# Patient Record
Sex: FEMALE | URBAN - METROPOLITAN AREA
[De-identification: names, ages, dates, MRNs, and addresses within clinical notes are randomized per-mention and may not be internally consistent; named-entity substitution may affect disease eponyms.]

---

## 2018-01-02 ENCOUNTER — TRANSFERRED RECORDS (OUTPATIENT)
Dept: HEALTH INFORMATION MANAGEMENT | Facility: CLINIC | Age: 78
End: 2018-01-02

## 2018-01-02 ENCOUNTER — HOSPITAL ENCOUNTER (INPATIENT)
Facility: CLINIC | Age: 78
LOS: 2 days | Discharge: HOME | DRG: 247 | End: 2018-01-04
Attending: INTERNAL MEDICINE | Admitting: INTERNAL MEDICINE
Payer: MEDICARE

## 2018-01-02 DIAGNOSIS — I24.9 ACS (ACUTE CORONARY SYNDROME) (H): Primary | ICD-10-CM

## 2018-01-02 LAB — GLUCOSE BLDC GLUCOMTR-MCNC: 106 MG/DL (ref 70–99)

## 2018-01-02 PROCEDURE — 84484 ASSAY OF TROPONIN QUANT: CPT | Performed by: HOSPITALIST

## 2018-01-02 PROCEDURE — 85610 PROTHROMBIN TIME: CPT | Performed by: HOSPITALIST

## 2018-01-02 PROCEDURE — 12000006 ZZH R&B IMCU INTERMEDIATE UMMC

## 2018-01-02 PROCEDURE — 85027 COMPLETE CBC AUTOMATED: CPT | Performed by: HOSPITALIST

## 2018-01-02 PROCEDURE — 80053 COMPREHEN METABOLIC PANEL: CPT | Performed by: HOSPITALIST

## 2018-01-02 PROCEDURE — 83036 HEMOGLOBIN GLYCOSYLATED A1C: CPT | Performed by: HOSPITALIST

## 2018-01-02 PROCEDURE — 93010 ELECTROCARDIOGRAM REPORT: CPT | Performed by: INTERNAL MEDICINE

## 2018-01-02 PROCEDURE — 00000146 ZZHCL STATISTIC GLUCOSE BY METER IP

## 2018-01-02 PROCEDURE — 36415 COLL VENOUS BLD VENIPUNCTURE: CPT | Performed by: HOSPITALIST

## 2018-01-02 PROCEDURE — 83880 ASSAY OF NATRIURETIC PEPTIDE: CPT | Performed by: HOSPITALIST

## 2018-01-02 RX ORDER — NICOTINE POLACRILEX 4 MG
15-30 LOZENGE BUCCAL
Status: DISCONTINUED | OUTPATIENT
Start: 2018-01-02 | End: 2018-01-04 | Stop reason: HOSPADM

## 2018-01-02 RX ORDER — ALUMINA, MAGNESIA, AND SIMETHICONE 2400; 2400; 240 MG/30ML; MG/30ML; MG/30ML
30 SUSPENSION ORAL EVERY 4 HOURS PRN
Status: DISCONTINUED | OUTPATIENT
Start: 2018-01-02 | End: 2018-01-04 | Stop reason: HOSPADM

## 2018-01-02 RX ORDER — DEXTROSE MONOHYDRATE 25 G/50ML
25-50 INJECTION, SOLUTION INTRAVENOUS
Status: DISCONTINUED | OUTPATIENT
Start: 2018-01-02 | End: 2018-01-04 | Stop reason: HOSPADM

## 2018-01-02 RX ORDER — ACETAMINOPHEN 325 MG/1
650 TABLET ORAL EVERY 4 HOURS PRN
Status: DISCONTINUED | OUTPATIENT
Start: 2018-01-02 | End: 2018-01-04 | Stop reason: HOSPADM

## 2018-01-02 RX ORDER — LIDOCAINE 40 MG/G
CREAM TOPICAL
Status: DISCONTINUED | OUTPATIENT
Start: 2018-01-02 | End: 2018-01-03

## 2018-01-02 RX ORDER — ACETAMINOPHEN 650 MG/1
650 SUPPOSITORY RECTAL EVERY 4 HOURS PRN
Status: DISCONTINUED | OUTPATIENT
Start: 2018-01-02 | End: 2018-01-04 | Stop reason: HOSPADM

## 2018-01-02 RX ORDER — NITROGLYCERIN 0.4 MG/1
0.4 TABLET SUBLINGUAL EVERY 5 MIN PRN
Status: DISCONTINUED | OUTPATIENT
Start: 2018-01-02 | End: 2018-01-03

## 2018-01-02 ASSESSMENT — ACTIVITIES OF DAILY LIVING (ADL)
BATHING: 0-->INDEPENDENT
COGNITION: 0 - NO COGNITION ISSUES REPORTED
TOILETING: 0-->INDEPENDENT
RETIRED_EATING: 0-->INDEPENDENT
RETIRED_COMMUNICATION: 0-->UNDERSTANDS/COMMUNICATES WITHOUT DIFFICULTY
AMBULATION: 0-->INDEPENDENT
TRANSFERRING: 0-->INDEPENDENT
SWALLOWING: 0-->SWALLOWS FOODS/LIQUIDS WITHOUT DIFFICULTY
FALL_HISTORY_WITHIN_LAST_SIX_MONTHS: NO
DRESS: 0-->INDEPENDENT

## 2018-01-02 NOTE — IP AVS SNAPSHOT
[address] [address]    Phone:  [phone]                                       After Visit Summary   1/2/2018    Katey Burrows    MRN: [id number]           After Visit Summary Signature Page     I have received my discharge instructions, and my questions have been answered. I have discussed any challenges I see with this plan with the nurse or doctor.    ..........................................................................................................................................  Patient/Patient Representative Signature      ..........................................................................................................................................  Patient Representative Print Name and Relationship to Patient    ..................................................               ................................................  Date                                            Time    ..........................................................................................................................................  Reviewed by Signature/Title    ...................................................              ..............................................  Date                                                            Time

## 2018-01-02 NOTE — IP AVS SNAPSHOT
MRN:[id number]                      After Visit Summary   1/2/2018    Katey Burrows    MRN: [id number]           Thank you!     Thank you for choosing [facility] for your care. Our goal is always to provide you with excellent care. Hearing back from our patients is one way we can continue to improve our services. Please take a few minutes to complete the written survey that you may receive in the mail after you visit with us. Thank you!        Patient Information     Date Of Birth          1940        Designated Caregiver       Most Recent Value    Caregiver    Will someone help with your care after discharge? 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                  Who to Call     For medical emergencies, please call 911.  For non-urgent questions about your medical care, please call your primary care provider or clinic, None          Attending Provider     Provider Specialty    Luciano Baldwin MD Cardiology       Primary Care Provider Fax #    Physician No Ref-Primary [phone]      After Care Instructions     Activity       Your activity upon discharge: activity as tolerated            Diet       Follow this diet upon discharge: Orders Placed This Encounter      Regular Diet Adult                  Follow-up Appointments     Adult [facility] Follow-up and recommended labs and tests       Follow up locally in [place] for cardiovascular care upon return.  You have requested records be sent to you for that appointment.  If you need assistance or need to see a provider here, the contact information:    Appointments on [place] and/or [facility]  (with [facility] or [facility] provider or service). Call [phone].                  Further instructions from your care team       Going Home after Coronary Angioplasty or Stent Placement       Name: aKtey Burrows  Medical Record Number:  [id number]  Today's Date: January 4, 2018        For 24 hours:         Have an adult stay with you for 24 hours.         Relax and take it easy.         Drink plenty of fluids.         You may eat your normal diet, unless your doctor tells you otherwise.         Do NOT make any important or legal decisions.         Do NOT drive or operate machines at home or at work.         Do NOT drink alcohol.      Do NOT smoke.     Medicines:         If you have begun Plavix (clopidogrel), Effient (prasugrel), or Brilinta (ticagrelor), do not stop taking it until you talk to your heart doctor (cardiologist).         If you are on metformin (Glucophage), do not restart it until you have blood tests (within 2 to 3 days after discharge). When your doctor tells you it is safe, you may restart the metformin.         If you have stopped any other medicines, check with your nurse or provider about when to restart them.    Care of wrist or arm site:         It is normal to have soreness at the puncture site and mild tingling in your hand for up to 3 days.           Remove the Band-Aid after 24 hours. If there is minor oozing, apply another Band-aid and remove it after 12 hours.          Do NOT take a bath, or use a hot tub or pool for the next 48 hours. You may shower.          It is normal to have a small bruise.  There should not be a lump at the site.         Do not scrub the site.         Do not use lotion or powder near the puncture site for 3 days.         For 2 days, do not use your hand or arm to support your weight (such as rising from a chair) or bend your wrist (such as lifting a garage door).         For 2 days, do not lift more than 5 pounds or exercise your arm (tennis, golf or bowling).    If you  "start bleeding from the site in your arm: Sit down and press firmly on the site with your fingers for 10 minutes. Call your doctor as soon as you can.      Call 911 right away if you have bleeding that is heavy or does not stop.     Call your doctor if:         You have a large or growing hard lump around the site.         The site is red, swollen, hot or tender.         Blood or fluid is draining from the site.         You have chills or a fever greater than 101 F (38 C).         Your leg or arm turns bluish, feels numb or cool.         You have hives, a rash or unusual itching.     Due cardiac rehab in [place].    ADDITIONAL INSTRUCTIONS: None    [facility] [place]:   [phone] (7 days a week)      Cardiology Fellow on call (24 hours per day) at [facility]:   [phone] (ask for Cardiology Fellow on call)      Number where we can reach you:  ____________    Pending Results     Date and Time Order Name Status Description    1/3/2018 1928 EKG 12-lead, tracing only Preliminary     1/3/2018 1828 EKG 12-lead, tracing only Preliminary     1/3/2018 1523 EKG 12-lead, tracing only  Preliminary             Statement of Approval     Ordered          01/04/18 1016  I have reviewed and agree with all the recommendations and orders detailed in this document.  EFFECTIVE NOW     Approved and electronically signed by:  Luciano Lnaier MD             Admission Information     Date & Time Provider Department Dept. Phone    1/2/2018 Luciano Baldwin MD [address] [facility] [place] [phone]      Your Vitals Were     Blood Pressure Pulse Temperature Respirations Height Weight    154/76 (BP Location: Right arm) 67 97.5  F (36.4  C) (Oral) 18 1.499 m (4' 11\") 79 kg (174 lb 2.6 oz)    Pulse Oximetry BMI (Body Mass Index)                98% 35.18 kg/m2          MyChart Information     [employer] lets you send messages to your doctor, view your test results, renew your prescriptions, schedule appointments " "and more. To sign up, go to [email or web address] . Click on \"Log in\" on the left side of the screen, which will take you to the Welcome page. Then click on \"Sign up Now\" on the right side of the page.     You will be asked to enter the access code listed below, as well as some personal information. Please follow the directions to create your username and password.     Your access code is: [email or web address]  Expires: 2018 11:09 AM     Your access code will  in 90 days. If you need help or a new code, please call your [facility] clinic or [phone].        Care EveryWhere ID     This is your Care EveryWhere ID. This could be used by other organizations to access your [facility] medical records  [id number]        Equal Access to Services     DENIZ [facility] Hospital[facility] : Valentin Waters, coreen bey, trae dnaiel, pearl gamble . So [facility] [phone].    ATENCIÓN: Si habla español, tiene a napoles disposición servicios gratuitos de asistencia lingüística. Llame al [phone].    We comply with applicable federal civil rights laws and [place] laws. We do not discriminate on the basis of race, color, national origin, age, disability, sex, sexual orientation, or gender identity.               Review of your medicines      START taking        Dose / Directions    atorvastatin 40 MG tablet   Commonly known as:  LIPITOR        Dose:  40 mg   Start taking on:  2018   Take 1 tablet (40 mg) by mouth daily   Quantity:  90 tablet   Refills:  3       metoprolol 25 MG tablet   Commonly known as:  LOPRESSOR        Dose:  12.5 mg   Take 0.5 tablets (12.5 mg) by mouth 2 times daily   Quantity:  180 tablet   Refills:  3       nitroGLYcerin 0.4 MG sublingual tablet   Commonly known as:  NITROSTAT        For chest pain place 1 tablet under the tongue every 5 minutes for 3 doses. If symptoms persist 5 minutes after 1st dose call 911.   Quantity:  25 tablet   Refills:  0       " ticagrelor 90 MG tablet   Commonly known as:  BRILINTA        Dose:  90 mg   Take 1 tablet (90 mg) by mouth every 12 hours   Quantity:  180 tablet   Refills:  3         CONTINUE these medicines which have NOT CHANGED        Dose / Directions    ASPIRIN 81 81 MG chewable tablet   Generic drug:  aspirin        Dose:  81 mg   Take 81 mg by mouth daily   Refills:  0       Exenatide ER 2 MG/0.85ML Auij        Dose:  2 mg   Inject 2 mg Subcutaneous once a week   Refills:  0       metFORMIN 1000 MG tablet   Commonly known as:  GLUCOPHAGE        Dose:  1000 mg   Take 1,000 mg by mouth 2 times daily (with meals)   Refills:  0       * NONFORMULARY        Dose:  60 mg   60 mg Gliclazide 60mg tabs   Refills:  0       * NONFORMULARY        rcanidipina 20mg tabs   Refills:  0       olmesartan-hydrochlorothiazide 20-12.5 MG per tablet   Commonly known as:  BENICAR        Dose:  1 tablet   Take 1 tablet by mouth   Refills:  0       THERAGRAN OR        Dose:  1 tablet   Take 1 tablet by mouth daily   Refills:  0       * Notice:  This list has 2 medication(s) that are the same as other medications prescribed for you. Read the directions carefully, and ask your doctor or other care provider to review them with you.      STOP taking     aspirin - buffered 325 MG Tabs tablet   Commonly known as:  ASCRIPTIN           ZOCOR PO                Where to get your medicines      These medications were sent to [facility] [place], [place] - [address] [address]     Phone:  [phone]     atorvastatin 40 MG tablet    metoprolol 25 MG tablet    nitroGLYcerin 0.4 MG sublingual tablet    ticagrelor 90 MG tablet                Protect others around you: Learn how to safely use, store and throw away your medicines at www.disposemymeds.org.             Medication List: This is a list of all your medications and when to take them. Check marks below indicate your daily home schedule. Keep  this list as a reference.      Medications           Morning Afternoon Evening Bedtime As Needed    ASPIRIN 81 81 MG chewable tablet   Take 81 mg by mouth daily   Last time this was given:  81 mg on 1/4/2018  9:46 AM   Generic drug:  aspirin                                atorvastatin 40 MG tablet   Commonly known as:  LIPITOR   Take 1 tablet (40 mg) by mouth daily   Start taking on:  1/5/2018   Last time this was given:  40 mg on 1/4/2018  9:46 AM                                Exenatide ER 2 MG/0.85ML Auij   Inject 2 mg Subcutaneous once a week                                metFORMIN 1000 MG tablet   Commonly known as:  GLUCOPHAGE   Take 1,000 mg by mouth 2 times daily (with meals)                                metoprolol 25 MG tablet   Commonly known as:  LOPRESSOR   Take 0.5 tablets (12.5 mg) by mouth 2 times daily   Last time this was given:  12.5 mg on 1/4/2018  9:46 AM                                nitroGLYcerin 0.4 MG sublingual tablet   Commonly known as:  NITROSTAT   For chest pain place 1 tablet under the tongue every 5 minutes for 3 doses. If symptoms persist 5 minutes after 1st dose call 911.   Last time this was given:  0.4 mg on 1/3/2018  6:32 PM                                * NONFORMULARY   60 mg Gliclazide 60mg tabs                                * NONFORMULARY   rcanidipina 20mg tabs                                olmesartan-hydrochlorothiazide 20-12.5 MG per tablet   Commonly known as:  BENICAR   Take 1 tablet by mouth                                THERAGRAN OR   Take 1 tablet by mouth daily                                ticagrelor 90 MG tablet   Commonly known as:  BRILINTA   Take 1 tablet (90 mg) by mouth every 12 hours   Last time this was given:  90 mg on 1/4/2018  9:46 AM                                * Notice:  This list has 2 medication(s) that are the same as other medications prescribed for you. Read the directions carefully, and ask your doctor or other care provider to review  them with you.

## 2018-01-03 ENCOUNTER — APPOINTMENT (OUTPATIENT)
Dept: CARDIOLOGY | Facility: CLINIC | Age: 78
DRG: 247 | End: 2018-01-03
Attending: INTERNAL MEDICINE
Payer: MEDICARE

## 2018-01-03 ENCOUNTER — APPOINTMENT (OUTPATIENT)
Dept: CARDIOLOGY | Facility: CLINIC | Age: 78
DRG: 247 | End: 2018-01-03
Attending: HOSPITALIST
Payer: MEDICARE

## 2018-01-03 ENCOUNTER — APPOINTMENT (OUTPATIENT)
Dept: GENERAL RADIOLOGY | Facility: CLINIC | Age: 78
DRG: 247 | End: 2018-01-03
Attending: INTERNAL MEDICINE
Payer: MEDICARE

## 2018-01-03 LAB
ALBUMIN SERPL-MCNC: 2.9 G/DL (ref 3.4–5)
ALP SERPL-CCNC: 77 U/L (ref 40–150)
ALT SERPL W P-5'-P-CCNC: 16 U/L (ref 0–50)
ANION GAP SERPL CALCULATED.3IONS-SCNC: 10 MMOL/L (ref 3–14)
ANION GAP SERPL CALCULATED.3IONS-SCNC: 9 MMOL/L (ref 3–14)
AST SERPL W P-5'-P-CCNC: 21 U/L (ref 0–45)
BILIRUB SERPL-MCNC: 0.2 MG/DL (ref 0.2–1.3)
BUN SERPL-MCNC: 21 MG/DL (ref 7–30)
BUN SERPL-MCNC: 22 MG/DL (ref 7–30)
CALCIUM SERPL-MCNC: 9 MG/DL (ref 8.5–10.1)
CALCIUM SERPL-MCNC: 9.2 MG/DL (ref 8.5–10.1)
CHLORIDE SERPL-SCNC: 107 MMOL/L (ref 94–109)
CHLORIDE SERPL-SCNC: 108 MMOL/L (ref 94–109)
CHOLEST SERPL-MCNC: 158 MG/DL
CO2 SERPL-SCNC: 25 MMOL/L (ref 20–32)
CO2 SERPL-SCNC: 27 MMOL/L (ref 20–32)
CREAT SERPL-MCNC: 0.82 MG/DL (ref 0.52–1.04)
CREAT SERPL-MCNC: 0.85 MG/DL (ref 0.52–1.04)
ERYTHROCYTE [DISTWIDTH] IN BLOOD BY AUTOMATED COUNT: 14 % (ref 10–15)
ERYTHROCYTE [DISTWIDTH] IN BLOOD BY AUTOMATED COUNT: 14.1 % (ref 10–15)
GFR SERPL CREATININE-BSD FRML MDRD: 65 ML/MIN/1.7M2
GFR SERPL CREATININE-BSD FRML MDRD: 67 ML/MIN/1.7M2
GLUCOSE BLDC GLUCOMTR-MCNC: 119 MG/DL (ref 70–99)
GLUCOSE BLDC GLUCOMTR-MCNC: 179 MG/DL (ref 70–99)
GLUCOSE BLDC GLUCOMTR-MCNC: 76 MG/DL (ref 70–99)
GLUCOSE BLDC GLUCOMTR-MCNC: 78 MG/DL (ref 70–99)
GLUCOSE SERPL-MCNC: 63 MG/DL (ref 70–99)
GLUCOSE SERPL-MCNC: 93 MG/DL (ref 70–99)
HBA1C MFR BLD: 6.5 % (ref 4.3–6)
HCT VFR BLD AUTO: 35 % (ref 35–47)
HCT VFR BLD AUTO: 37.6 % (ref 35–47)
HDLC SERPL-MCNC: 53 MG/DL
HGB BLD-MCNC: 10.6 G/DL (ref 11.7–15.7)
HGB BLD-MCNC: 11.5 G/DL (ref 11.7–15.7)
INR PPP: 1.07 (ref 0.86–1.14)
INTERPRETATION ECG - MUSE: NORMAL
KCT BLD-ACNC: 282 SEC (ref 105–167)
LDLC SERPL CALC-MCNC: 73 MG/DL
MCH RBC QN AUTO: 27 PG (ref 26.5–33)
MCH RBC QN AUTO: 27.3 PG (ref 26.5–33)
MCHC RBC AUTO-ENTMCNC: 30.3 G/DL (ref 31.5–36.5)
MCHC RBC AUTO-ENTMCNC: 30.6 G/DL (ref 31.5–36.5)
MCV RBC AUTO: 89 FL (ref 78–100)
MCV RBC AUTO: 89 FL (ref 78–100)
NONHDLC SERPL-MCNC: 105 MG/DL
NT-PROBNP SERPL-MCNC: 716 PG/ML (ref 0–1800)
PLATELET # BLD AUTO: 231 10E9/L (ref 150–450)
PLATELET # BLD AUTO: 248 10E9/L (ref 150–450)
POTASSIUM SERPL-SCNC: 4 MMOL/L (ref 3.4–5.3)
POTASSIUM SERPL-SCNC: 4 MMOL/L (ref 3.4–5.3)
PROT SERPL-MCNC: 6.7 G/DL (ref 6.8–8.8)
RBC # BLD AUTO: 3.93 10E12/L (ref 3.8–5.2)
RBC # BLD AUTO: 4.22 10E12/L (ref 3.8–5.2)
SODIUM SERPL-SCNC: 141 MMOL/L (ref 133–144)
SODIUM SERPL-SCNC: 144 MMOL/L (ref 133–144)
TRIGL SERPL-MCNC: 157 MG/DL
TROPONIN I SERPL-MCNC: 1.74 UG/L (ref 0–0.04)
TROPONIN I SERPL-MCNC: 2.51 UG/L (ref 0–0.04)
WBC # BLD AUTO: 7.9 10E9/L (ref 4–11)
WBC # BLD AUTO: 9.3 10E9/L (ref 4–11)

## 2018-01-03 PROCEDURE — 36415 COLL VENOUS BLD VENIPUNCTURE: CPT | Performed by: INTERNAL MEDICINE

## 2018-01-03 PROCEDURE — 25000128 H RX IP 250 OP 636: Performed by: INTERNAL MEDICINE

## 2018-01-03 PROCEDURE — 27210762 ZZH DEVICE SUTURELESS SECUREMENT EA CR2

## 2018-01-03 PROCEDURE — 99223 1ST HOSP IP/OBS HIGH 75: CPT | Mod: AI | Performed by: INTERNAL MEDICINE

## 2018-01-03 PROCEDURE — C9606 PERC D-E COR REVASC W AMI S: HCPCS | Mod: LD

## 2018-01-03 PROCEDURE — 80048 BASIC METABOLIC PNL TOTAL CA: CPT | Performed by: HOSPITALIST

## 2018-01-03 PROCEDURE — C9460 INJECTION, CANGRELOR: HCPCS | Performed by: INTERNAL MEDICINE

## 2018-01-03 PROCEDURE — 12000006 ZZH R&B IMCU INTERMEDIATE UMMC

## 2018-01-03 PROCEDURE — 27210742 ZZH CATH CR1

## 2018-01-03 PROCEDURE — 93010 ELECTROCARDIOGRAM REPORT: CPT | Mod: 76 | Performed by: INTERNAL MEDICINE

## 2018-01-03 PROCEDURE — 80061 LIPID PANEL: CPT | Performed by: INTERNAL MEDICINE

## 2018-01-03 PROCEDURE — 27210998 ZZH ACCESS HEART CATH CR6

## 2018-01-03 PROCEDURE — 93005 ELECTROCARDIOGRAM TRACING: CPT

## 2018-01-03 PROCEDURE — C1887 CATHETER, GUIDING: HCPCS

## 2018-01-03 PROCEDURE — C1769 GUIDE WIRE: HCPCS

## 2018-01-03 PROCEDURE — 25000132 ZZH RX MED GY IP 250 OP 250 PS 637: Performed by: INTERNAL MEDICINE

## 2018-01-03 PROCEDURE — 93454 CORONARY ARTERY ANGIO S&I: CPT

## 2018-01-03 PROCEDURE — 85027 COMPLETE CBC AUTOMATED: CPT | Performed by: INTERNAL MEDICINE

## 2018-01-03 PROCEDURE — 92941 PRQ TRLML REVSC TOT OCCL AMI: CPT | Mod: LD | Performed by: INTERNAL MEDICINE

## 2018-01-03 PROCEDURE — 27210760 ZZH DEVICE INFLATION CR7

## 2018-01-03 PROCEDURE — 00000146 ZZHCL STATISTIC GLUCOSE BY METER IP

## 2018-01-03 PROCEDURE — 40000264 ECHO COMPLETE WITH OPTISON

## 2018-01-03 PROCEDURE — 27210787 ZZH MANIFOLD CR2

## 2018-01-03 PROCEDURE — 25000132 ZZH RX MED GY IP 250 OP 250 PS 637: Performed by: HOSPITALIST

## 2018-01-03 PROCEDURE — 027036Z DILATION OF CORONARY ARTERY, ONE ARTERY WITH THREE DRUG-ELUTING INTRALUMINAL DEVICES, PERCUTANEOUS APPROACH: ICD-10-PCS | Performed by: INTERNAL MEDICINE

## 2018-01-03 PROCEDURE — C1874 STENT, COATED/COV W/DEL SYS: HCPCS

## 2018-01-03 PROCEDURE — 99152 MOD SED SAME PHYS/QHP 5/>YRS: CPT

## 2018-01-03 PROCEDURE — 25500064 ZZH RX 255 OP 636: Performed by: INTERNAL MEDICINE

## 2018-01-03 PROCEDURE — 99153 MOD SED SAME PHYS/QHP EA: CPT

## 2018-01-03 PROCEDURE — 27211089 ZZH KIT ACIST INJECTOR CR3

## 2018-01-03 PROCEDURE — 27210843 ZZH DEVICE COMPRESSION CR12

## 2018-01-03 PROCEDURE — 85347 COAGULATION TIME ACTIVATED: CPT

## 2018-01-03 PROCEDURE — C1894 INTRO/SHEATH, NON-LASER: HCPCS

## 2018-01-03 PROCEDURE — 27210946 ZZH KIT HC TOTES DISP CR8

## 2018-01-03 PROCEDURE — 84484 ASSAY OF TROPONIN QUANT: CPT | Performed by: INTERNAL MEDICINE

## 2018-01-03 PROCEDURE — C9600 PERC DRUG-EL COR STENT SING: HCPCS

## 2018-01-03 PROCEDURE — 80048 BASIC METABOLIC PNL TOTAL CA: CPT | Performed by: INTERNAL MEDICINE

## 2018-01-03 PROCEDURE — B2111ZZ FLUOROSCOPY OF MULTIPLE CORONARY ARTERIES USING LOW OSMOLAR CONTRAST: ICD-10-PCS | Performed by: INTERNAL MEDICINE

## 2018-01-03 PROCEDURE — C1725 CATH, TRANSLUMIN NON-LASER: HCPCS

## 2018-01-03 PROCEDURE — 93306 TTE W/DOPPLER COMPLETE: CPT | Mod: 26 | Performed by: INTERNAL MEDICINE

## 2018-01-03 PROCEDURE — 93454 CORONARY ARTERY ANGIO S&I: CPT | Mod: 26 | Performed by: INTERNAL MEDICINE

## 2018-01-03 PROCEDURE — 40000275 ZZH STATISTIC RCP TIME EA 10 MIN

## 2018-01-03 PROCEDURE — 25000125 ZZHC RX 250: Performed by: INTERNAL MEDICINE

## 2018-01-03 PROCEDURE — 71045 X-RAY EXAM CHEST 1 VIEW: CPT

## 2018-01-03 RX ORDER — IOPAMIDOL 755 MG/ML
210 INJECTION, SOLUTION INTRAVASCULAR ONCE
Status: COMPLETED | OUTPATIENT
Start: 2018-01-03 | End: 2018-01-03

## 2018-01-03 RX ORDER — PROTAMINE SULFATE 10 MG/ML
1-5 INJECTION, SOLUTION INTRAVENOUS
Status: DISCONTINUED | OUTPATIENT
Start: 2018-01-03 | End: 2018-01-03 | Stop reason: HOSPADM

## 2018-01-03 RX ORDER — ARGATROBAN 1 MG/ML
350 INJECTION, SOLUTION INTRAVENOUS
Status: DISCONTINUED | OUTPATIENT
Start: 2018-01-03 | End: 2018-01-03 | Stop reason: HOSPADM

## 2018-01-03 RX ORDER — HEPARIN SODIUM 10000 [USP'U]/100ML
0-3500 INJECTION, SOLUTION INTRAVENOUS CONTINUOUS
Status: DISCONTINUED | OUTPATIENT
Start: 2018-01-03 | End: 2018-01-03

## 2018-01-03 RX ORDER — CLOPIDOGREL BISULFATE 75 MG/1
300-600 TABLET ORAL
Status: DISCONTINUED | OUTPATIENT
Start: 2018-01-03 | End: 2018-01-03 | Stop reason: HOSPADM

## 2018-01-03 RX ORDER — NICARDIPINE HYDROCHLORIDE 2.5 MG/ML
100 INJECTION INTRAVENOUS
Status: DISCONTINUED | OUTPATIENT
Start: 2018-01-03 | End: 2018-01-03 | Stop reason: HOSPADM

## 2018-01-03 RX ORDER — DOPAMINE HYDROCHLORIDE 160 MG/100ML
2-20 INJECTION, SOLUTION INTRAVENOUS CONTINUOUS PRN
Status: DISCONTINUED | OUTPATIENT
Start: 2018-01-03 | End: 2018-01-03 | Stop reason: HOSPADM

## 2018-01-03 RX ORDER — METHYLPREDNISOLONE SODIUM SUCCINATE 125 MG/2ML
125 INJECTION, POWDER, LYOPHILIZED, FOR SOLUTION INTRAMUSCULAR; INTRAVENOUS
Status: DISCONTINUED | OUTPATIENT
Start: 2018-01-03 | End: 2018-01-03 | Stop reason: HOSPADM

## 2018-01-03 RX ORDER — NITROGLYCERIN 5 MG/ML
100-500 VIAL (ML) INTRAVENOUS
Status: COMPLETED | OUTPATIENT
Start: 2018-01-03 | End: 2018-01-03

## 2018-01-03 RX ORDER — ASPIRIN 81 MG/1
81 TABLET ORAL DAILY
Status: DISCONTINUED | OUTPATIENT
Start: 2018-01-03 | End: 2018-01-03

## 2018-01-03 RX ORDER — ASPIRIN 325 MG/1
325 TABLET, FILM COATED ORAL DAILY
Status: ON HOLD | COMMUNITY
End: 2018-01-04

## 2018-01-03 RX ORDER — METOPROLOL TARTRATE 1 MG/ML
5 INJECTION, SOLUTION INTRAVENOUS EVERY 5 MIN PRN
Status: DISCONTINUED | OUTPATIENT
Start: 2018-01-03 | End: 2018-01-03 | Stop reason: HOSPADM

## 2018-01-03 RX ORDER — ADENOSINE 3 MG/ML
12-12000 INJECTION, SOLUTION INTRAVENOUS
Status: DISCONTINUED | OUTPATIENT
Start: 2018-01-03 | End: 2018-01-03 | Stop reason: HOSPADM

## 2018-01-03 RX ORDER — LIDOCAINE 40 MG/G
CREAM TOPICAL
Status: DISCONTINUED | OUTPATIENT
Start: 2018-01-03 | End: 2018-01-04 | Stop reason: HOSPADM

## 2018-01-03 RX ORDER — ONDANSETRON 2 MG/ML
4 INJECTION INTRAMUSCULAR; INTRAVENOUS EVERY 4 HOURS PRN
Status: DISCONTINUED | OUTPATIENT
Start: 2018-01-03 | End: 2018-01-03 | Stop reason: HOSPADM

## 2018-01-03 RX ORDER — EPINEPHRINE 1 MG/ML
0.3 INJECTION, SOLUTION, CONCENTRATE INTRAVENOUS
Status: DISCONTINUED | OUTPATIENT
Start: 2018-01-03 | End: 2018-01-03 | Stop reason: HOSPADM

## 2018-01-03 RX ORDER — NALOXONE HYDROCHLORIDE 0.4 MG/ML
0.4 INJECTION, SOLUTION INTRAMUSCULAR; INTRAVENOUS; SUBCUTANEOUS EVERY 5 MIN PRN
Status: DISCONTINUED | OUTPATIENT
Start: 2018-01-03 | End: 2018-01-03 | Stop reason: HOSPADM

## 2018-01-03 RX ORDER — LIDOCAINE HYDROCHLORIDE 10 MG/ML
30 INJECTION, SOLUTION EPIDURAL; INFILTRATION; INTRACAUDAL; PERINEURAL
Status: DISCONTINUED | OUTPATIENT
Start: 2018-01-03 | End: 2018-01-03 | Stop reason: HOSPADM

## 2018-01-03 RX ORDER — PRASUGREL 10 MG/1
10-60 TABLET, FILM COATED ORAL
Status: DISCONTINUED | OUTPATIENT
Start: 2018-01-03 | End: 2018-01-03 | Stop reason: HOSPADM

## 2018-01-03 RX ORDER — ENALAPRILAT 1.25 MG/ML
1.25-2.5 INJECTION INTRAVENOUS
Status: DISCONTINUED | OUTPATIENT
Start: 2018-01-03 | End: 2018-01-03 | Stop reason: HOSPADM

## 2018-01-03 RX ORDER — ASPIRIN 81 MG/1
81-324 TABLET, CHEWABLE ORAL
Status: DISCONTINUED | OUTPATIENT
Start: 2018-01-03 | End: 2018-01-03 | Stop reason: HOSPADM

## 2018-01-03 RX ORDER — SODIUM CHLORIDE 9 MG/ML
INJECTION, SOLUTION INTRAVENOUS CONTINUOUS
Status: ACTIVE | OUTPATIENT
Start: 2018-01-03 | End: 2018-01-03

## 2018-01-03 RX ORDER — OLMESARTAN MEDOXOMIL AND HYDROCHLOROTHIAZIDE 20/12.5 20; 12.5 MG/1; MG/1
1 TABLET ORAL
COMMUNITY

## 2018-01-03 RX ORDER — EPTIFIBATIDE 2 MG/ML
2 INJECTION, SOLUTION INTRAVENOUS CONTINUOUS PRN
Status: DISCONTINUED | OUTPATIENT
Start: 2018-01-03 | End: 2018-01-03 | Stop reason: HOSPADM

## 2018-01-03 RX ORDER — EPTIFIBATIDE 2 MG/ML
180 INJECTION, SOLUTION INTRAVENOUS EVERY 10 MIN PRN
Status: DISCONTINUED | OUTPATIENT
Start: 2018-01-03 | End: 2018-01-03 | Stop reason: HOSPADM

## 2018-01-03 RX ORDER — ASPIRIN 81 MG/1
324 TABLET, CHEWABLE ORAL ONCE
Status: DISCONTINUED | OUTPATIENT
Start: 2018-01-03 | End: 2018-01-03 | Stop reason: DRUGHIGH

## 2018-01-03 RX ORDER — POTASSIUM CHLORIDE 7.45 MG/ML
10 INJECTION INTRAVENOUS
Status: DISCONTINUED | OUTPATIENT
Start: 2018-01-03 | End: 2018-01-03 | Stop reason: HOSPADM

## 2018-01-03 RX ORDER — DIPHENHYDRAMINE HYDROCHLORIDE 50 MG/ML
25-50 INJECTION INTRAMUSCULAR; INTRAVENOUS
Status: DISCONTINUED | OUTPATIENT
Start: 2018-01-03 | End: 2018-01-03 | Stop reason: HOSPADM

## 2018-01-03 RX ORDER — PROTAMINE SULFATE 10 MG/ML
25-100 INJECTION, SOLUTION INTRAVENOUS EVERY 5 MIN PRN
Status: DISCONTINUED | OUTPATIENT
Start: 2018-01-03 | End: 2018-01-03 | Stop reason: HOSPADM

## 2018-01-03 RX ORDER — NIFEDIPINE 10 MG/1
10 CAPSULE ORAL
Status: DISCONTINUED | OUTPATIENT
Start: 2018-01-03 | End: 2018-01-03 | Stop reason: HOSPADM

## 2018-01-03 RX ORDER — DOBUTAMINE HYDROCHLORIDE 200 MG/100ML
2-20 INJECTION INTRAVENOUS CONTINUOUS PRN
Status: DISCONTINUED | OUTPATIENT
Start: 2018-01-03 | End: 2018-01-03 | Stop reason: HOSPADM

## 2018-01-03 RX ORDER — NITROGLYCERIN 20 MG/100ML
.07-2 INJECTION INTRAVENOUS CONTINUOUS PRN
Status: DISCONTINUED | OUTPATIENT
Start: 2018-01-03 | End: 2018-01-03 | Stop reason: HOSPADM

## 2018-01-03 RX ORDER — MAGNESIUM HYDROXIDE 1200 MG/15ML
1000 LIQUID ORAL CONTINUOUS PRN
Status: DISCONTINUED | OUTPATIENT
Start: 2018-01-03 | End: 2018-01-03 | Stop reason: HOSPADM

## 2018-01-03 RX ORDER — VERAPAMIL HYDROCHLORIDE 2.5 MG/ML
1-5 INJECTION, SOLUTION INTRAVENOUS
Status: DISCONTINUED | OUTPATIENT
Start: 2018-01-03 | End: 2018-01-03 | Stop reason: HOSPADM

## 2018-01-03 RX ORDER — POTASSIUM CHLORIDE 29.8 MG/ML
20 INJECTION INTRAVENOUS
Status: DISCONTINUED | OUTPATIENT
Start: 2018-01-03 | End: 2018-01-03 | Stop reason: HOSPADM

## 2018-01-03 RX ORDER — LABETALOL HYDROCHLORIDE 5 MG/ML
10 INJECTION, SOLUTION INTRAVENOUS
Status: DISCONTINUED | OUTPATIENT
Start: 2018-01-03 | End: 2018-01-04 | Stop reason: HOSPADM

## 2018-01-03 RX ORDER — NALOXONE HYDROCHLORIDE 0.4 MG/ML
.1-.4 INJECTION, SOLUTION INTRAMUSCULAR; INTRAVENOUS; SUBCUTANEOUS
Status: DISCONTINUED | OUTPATIENT
Start: 2018-01-03 | End: 2018-01-04 | Stop reason: HOSPADM

## 2018-01-03 RX ORDER — FUROSEMIDE 10 MG/ML
20-100 INJECTION INTRAMUSCULAR; INTRAVENOUS
Status: DISCONTINUED | OUTPATIENT
Start: 2018-01-03 | End: 2018-01-03 | Stop reason: HOSPADM

## 2018-01-03 RX ORDER — FENTANYL CITRATE 50 UG/ML
25-50 INJECTION, SOLUTION INTRAMUSCULAR; INTRAVENOUS
Status: ACTIVE | OUTPATIENT
Start: 2018-01-03 | End: 2018-01-04

## 2018-01-03 RX ORDER — PROMETHAZINE HYDROCHLORIDE 25 MG/ML
6.25-25 INJECTION, SOLUTION INTRAMUSCULAR; INTRAVENOUS EVERY 4 HOURS PRN
Status: DISCONTINUED | OUTPATIENT
Start: 2018-01-03 | End: 2018-01-03 | Stop reason: HOSPADM

## 2018-01-03 RX ORDER — ASPIRIN 81 MG/1
81 TABLET, CHEWABLE ORAL DAILY
Status: ON HOLD | COMMUNITY
End: 2018-01-05

## 2018-01-03 RX ORDER — HEPARIN SODIUM 1000 [USP'U]/ML
1000-10000 INJECTION, SOLUTION INTRAVENOUS; SUBCUTANEOUS EVERY 5 MIN PRN
Status: DISCONTINUED | OUTPATIENT
Start: 2018-01-03 | End: 2018-01-03 | Stop reason: HOSPADM

## 2018-01-03 RX ORDER — ATROPINE SULFATE 0.1 MG/ML
.5-1 INJECTION INTRAVENOUS
Status: DISCONTINUED | OUTPATIENT
Start: 2018-01-03 | End: 2018-01-03 | Stop reason: HOSPADM

## 2018-01-03 RX ORDER — ASPIRIN 81 MG/1
243 TABLET, CHEWABLE ORAL ONCE
Status: COMPLETED | OUTPATIENT
Start: 2018-01-03 | End: 2018-01-03

## 2018-01-03 RX ORDER — DEXTROSE MONOHYDRATE 25 G/50ML
12.5-5 INJECTION, SOLUTION INTRAVENOUS EVERY 30 MIN PRN
Status: DISCONTINUED | OUTPATIENT
Start: 2018-01-03 | End: 2018-01-03 | Stop reason: HOSPADM

## 2018-01-03 RX ORDER — ARGATROBAN 1 MG/ML
150 INJECTION, SOLUTION INTRAVENOUS
Status: DISCONTINUED | OUTPATIENT
Start: 2018-01-03 | End: 2018-01-03 | Stop reason: HOSPADM

## 2018-01-03 RX ORDER — NITROGLYCERIN 0.4 MG/1
0.4 TABLET SUBLINGUAL EVERY 5 MIN PRN
Status: DISCONTINUED | OUTPATIENT
Start: 2018-01-03 | End: 2018-01-03 | Stop reason: HOSPADM

## 2018-01-03 RX ORDER — SODIUM NITROPRUSSIDE 25 MG/ML
100-200 INJECTION INTRAVENOUS
Status: DISCONTINUED | OUTPATIENT
Start: 2018-01-03 | End: 2018-01-03 | Stop reason: HOSPADM

## 2018-01-03 RX ORDER — ATROPINE SULFATE 0.1 MG/ML
0.5 INJECTION INTRAVENOUS EVERY 5 MIN PRN
Status: ACTIVE | OUTPATIENT
Start: 2018-01-03 | End: 2018-01-04

## 2018-01-03 RX ORDER — FLUMAZENIL 0.1 MG/ML
0.2 INJECTION, SOLUTION INTRAVENOUS
Status: DISCONTINUED | OUTPATIENT
Start: 2018-01-03 | End: 2018-01-03 | Stop reason: HOSPADM

## 2018-01-03 RX ORDER — CLOPIDOGREL BISULFATE 75 MG/1
75 TABLET ORAL
Status: DISCONTINUED | OUTPATIENT
Start: 2018-01-03 | End: 2018-01-03 | Stop reason: HOSPADM

## 2018-01-03 RX ORDER — NALOXONE HYDROCHLORIDE 0.4 MG/ML
.2-.4 INJECTION, SOLUTION INTRAMUSCULAR; INTRAVENOUS; SUBCUTANEOUS
Status: DISCONTINUED | OUTPATIENT
Start: 2018-01-03 | End: 2018-01-03

## 2018-01-03 RX ORDER — PHENYLEPHRINE HCL IN 0.9% NACL 1 MG/10 ML
20-100 SYRINGE (ML) INTRAVENOUS
Status: DISCONTINUED | OUTPATIENT
Start: 2018-01-03 | End: 2018-01-03 | Stop reason: HOSPADM

## 2018-01-03 RX ORDER — FENTANYL CITRATE 50 UG/ML
25-50 INJECTION, SOLUTION INTRAMUSCULAR; INTRAVENOUS
Status: DISCONTINUED | OUTPATIENT
Start: 2018-01-03 | End: 2018-01-03 | Stop reason: HOSPADM

## 2018-01-03 RX ORDER — NITROGLYCERIN 5 MG/ML
100-200 VIAL (ML) INTRAVENOUS
Status: DISCONTINUED | OUTPATIENT
Start: 2018-01-03 | End: 2018-01-03 | Stop reason: HOSPADM

## 2018-01-03 RX ORDER — LORAZEPAM 2 MG/ML
.5-2 INJECTION INTRAMUSCULAR EVERY 4 HOURS PRN
Status: DISCONTINUED | OUTPATIENT
Start: 2018-01-03 | End: 2018-01-03 | Stop reason: HOSPADM

## 2018-01-03 RX ORDER — ASPIRIN 81 MG/1
81 TABLET, CHEWABLE ORAL DAILY
Status: DISCONTINUED | OUTPATIENT
Start: 2018-01-03 | End: 2018-01-04 | Stop reason: HOSPADM

## 2018-01-03 RX ORDER — FLUMAZENIL 0.1 MG/ML
0.2 INJECTION, SOLUTION INTRAVENOUS
Status: ACTIVE | OUTPATIENT
Start: 2018-01-03 | End: 2018-01-04

## 2018-01-03 RX ORDER — ASPIRIN 81 MG/1
243 TABLET, CHEWABLE ORAL DAILY
Status: DISCONTINUED | OUTPATIENT
Start: 2018-01-03 | End: 2018-01-03

## 2018-01-03 RX ORDER — HYDRALAZINE HYDROCHLORIDE 20 MG/ML
10-20 INJECTION INTRAMUSCULAR; INTRAVENOUS
Status: DISCONTINUED | OUTPATIENT
Start: 2018-01-03 | End: 2018-01-03 | Stop reason: HOSPADM

## 2018-01-03 RX ORDER — NITROGLYCERIN 0.4 MG/1
0.4 TABLET SUBLINGUAL EVERY 5 MIN PRN
Status: DISCONTINUED | OUTPATIENT
Start: 2018-01-03 | End: 2018-01-04 | Stop reason: HOSPADM

## 2018-01-03 RX ORDER — ASPIRIN 325 MG
325 TABLET ORAL
Status: DISCONTINUED | OUTPATIENT
Start: 2018-01-03 | End: 2018-01-03 | Stop reason: HOSPADM

## 2018-01-03 RX ADMIN — SODIUM CHLORIDE: 9 INJECTION, SOLUTION INTRAVENOUS at 17:24

## 2018-01-03 RX ADMIN — CANGRELOR 4 MCG/KG/MIN: 50 INJECTION, POWDER, LYOPHILIZED, FOR SOLUTION INTRAVENOUS at 15:00

## 2018-01-03 RX ADMIN — ASPIRIN 81 MG CHEWABLE TABLET 81 MG: 81 TABLET CHEWABLE at 10:45

## 2018-01-03 RX ADMIN — NITROGLYCERIN 300 MCG: 5 INJECTION, SOLUTION INTRAVENOUS at 15:07

## 2018-01-03 RX ADMIN — TICAGRELOR 90 MG: 90 TABLET ORAL at 20:13

## 2018-01-03 RX ADMIN — NICARDIPINE HYDROCHLORIDE 300 MCG: 2.5 INJECTION INTRAVENOUS at 14:35

## 2018-01-03 RX ADMIN — IOPAMIDOL 210 ML: 755 INJECTION, SOLUTION INTRAVASCULAR at 15:30

## 2018-01-03 RX ADMIN — NICARDIPINE HYDROCHLORIDE 200 MCG: 2.5 INJECTION INTRAVENOUS at 13:59

## 2018-01-03 RX ADMIN — NITROGLYCERIN 200 MCG: 5 INJECTION, SOLUTION INTRAVENOUS at 14:00

## 2018-01-03 RX ADMIN — NITROGLYCERIN 200 MCG: 5 INJECTION, SOLUTION INTRAVENOUS at 14:31

## 2018-01-03 RX ADMIN — Medication 12.5 MG: at 20:13

## 2018-01-03 RX ADMIN — Medication 12.5 MG: at 10:45

## 2018-01-03 RX ADMIN — TICAGRELOR 180 MG: 90 TABLET ORAL at 15:16

## 2018-01-03 RX ADMIN — ASPIRIN 81 MG CHEWABLE TABLET 243 MG: 81 TABLET CHEWABLE at 12:06

## 2018-01-03 RX ADMIN — HUMAN ALBUMIN MICROSPHERES AND PERFLUTREN 4 ML: 10; .22 INJECTION, SOLUTION INTRAVENOUS at 09:30

## 2018-01-03 RX ADMIN — HEPARIN SODIUM 5000 UNITS: 1000 INJECTION, SOLUTION INTRAVENOUS; SUBCUTANEOUS at 13:57

## 2018-01-03 RX ADMIN — Medication 1500 UNITS: at 13:58

## 2018-01-03 RX ADMIN — FENTANYL CITRATE 50 MCG: 50 INJECTION, SOLUTION INTRAMUSCULAR; INTRAVENOUS at 14:31

## 2018-01-03 RX ADMIN — Medication 500 UNITS: at 13:57

## 2018-01-03 RX ADMIN — HEPARIN SODIUM 1000 UNITS/HR: 10000 INJECTION, SOLUTION INTRAVENOUS at 06:42

## 2018-01-03 RX ADMIN — HEPARIN SODIUM 5000 UNITS: 1000 INJECTION, SOLUTION INTRAVENOUS; SUBCUTANEOUS at 14:12

## 2018-01-03 RX ADMIN — NITROGLYCERIN 0.4 MG: 0.4 TABLET SUBLINGUAL at 18:32

## 2018-01-03 RX ADMIN — NICARDIPINE HYDROCHLORIDE 200 MCG: 2.5 INJECTION INTRAVENOUS at 14:40

## 2018-01-03 RX ADMIN — FENTANYL CITRATE 50 MCG: 50 INJECTION, SOLUTION INTRAMUSCULAR; INTRAVENOUS at 13:53

## 2018-01-03 ASSESSMENT — ACTIVITIES OF DAILY LIVING (ADL)
ADLS_ACUITY_SCORE: 12
ADLS_ACUITY_SCORE: 13
ADLS_ACUITY_SCORE: 14
ADLS_ACUITY_SCORE: 12
ADLS_ACUITY_SCORE: 14
ADLS_ACUITY_SCORE: 12

## 2018-01-03 ASSESSMENT — PAIN DESCRIPTION - DESCRIPTORS: DESCRIPTORS: DISCOMFORT

## 2018-01-03 NOTE — PROGRESS NOTES
Admission          1/2/2018 10:10 PM  -----------------------------------------------------------  Reason for admission: Transfer from [facility] for further cardiac work-up and eval.  Primary team notified of pt arrival.  Admitted from: [facility]  Via: stretcher w/ EMS  Accompanied by: daughter  Belongings: Clothing & shoes at bedside. Home meds sent to pharmacy for re-labeling.  Admission Profile: complete  Teaching: orientation to unit and call light- call light within reach, call don't fall, use of console, meal times, when to call for the RN, and enforced importance of safety   Access: PIV  Telemetry: Placed on pt  Ht./Wt.: complete  2 RN Skin Assessment Completed By: Diane MADDOX & Kalee PEÑA     Pt status: A&O. Pt denies SOB & active chest pain - some radiation from previously to r. Shoulder. /69 (BP Location: Left arm)  Pulse 90  Temp 97.9  F (36.6  C) (Oral)  Resp 18  SpO2 97%  On RA. Pt voided w/out measurement. Will continue to monitor closely & update with any changes.     Temp:  [97.9  F (36.6  C)] 97.9  F (36.6  C)  Pulse:  [90] 90  Heart Rate:  [88-90] 88  Resp:  [16-18] 18  BP: (150-160)/(69-70) 160/69  SpO2:  [96 %-97 %] 97 %

## 2018-01-03 NOTE — PROGRESS NOTES
[facility] Cardiology Progress Note           Assessment and Plan:     Katey Burrows is a 77 year old female with a past medical history most remarkable for diabetes type 2, hypertension, hyperlipidemia who presented to [facility] emergency department with unstable angina.     1.  Chest pain.  Most consistent with unstable angina.  - Trop peaked at 2.5  - Formal echo pending  - Consented to cath lab  - Start Metoprolol 12.5 mg BID  - If CAD found, would start low dose lisinopril  - Statin if CAD present  - Lipids pending  - ASA 81 mg daily     2. Posterior Tibial Vein Thrombosis - Unclear etiology, most likely provoked in the setting of prolonged hospitalization prior to arriving in the [place].  - Currently on heparin gtt  - Would discharge on NOAC     Chronic Conditions  1. HTN - Will await pharmacy medication reconciliation  2. DM2 - On sliding scale insulin     Other  Diet: NPO for Medical/Clinical Reasons Except for: Meds  Fluids: None  DVT Prophylaxis: Heparin gtt  Code Status: Full Code     Patient seen and discussed with Dr. Denny Lanier MD  Cardiovascular Disease Fellow        Interval History:     No acute events  Trop peaked  No ongoing chest pain          Medications:       [START ON 1/6/2018] influenza Vac Split High-Dose  0.5 mL Intramuscular Prior to discharge     aspirin  81 mg Oral Daily     metoprolol  12.5 mg Oral BID     aspirin  324 mg Oral Once     aspirin  243 mg Oral Once     sodium chloride (PF)  3 mL Intracatheter Q8H     insulin aspart  1-7 Units Subcutaneous TID AC     insulin aspart  1-5 Units Subcutaneous At Bedtime             Physical Exam:   Temp:  [97.4  F (36.3  C)-97.9  F (36.6  C)] 97.5  F (36.4  C)  Pulse:  [67-90] 67  Heart Rate:  [67-90] 67  Resp:  [16-18] 18  BP: (135-160)/(64-76) 148/72  SpO2:  [94 %-97 %] 94 %  No intake or output data in the 24 hours ending 01/03/18 1156    General: alert, interactive, NAD  Eyes: sclera  anicteric, EOMI  Neck: JVP without elevation  Cardiovascular: regular rate and rhythm, normal S1 and S2, no murmurs, no gallops, no rubs  Resp: clear to auscultation bilaterally, no rales, wheezes, or rhonchi  GI: soft, nontender, nondistended. +BS.  No HSM or masses, no rebound or guarding.  Extremities: warm, without edema, no cyanosis or clubbing, dorsalis pedis and posterior tibialis pulses 2+ bilaterally  Skin: no jaundice or rash  Neuro: moves all extremities equally  Psych: alert & oriented x 3              Data:   ROUTINE IP LABS (Last four results)  BMP  Recent Labs  Lab 01/03/18  0632 01/02/18  2358    141   POTASSIUM 4.0 4.0   CHLORIDE 108 107   CYNTHIA 9.0 9.2   CO2 27 25   BUN 21 22   CR 0.85 0.82   GLC 63* 93     CBC  Recent Labs  Lab 01/03/18  0632 01/02/18  2358   WBC 7.9 9.3   RBC 3.93 4.22   HGB 10.6* 11.5*   HCT 35.0 37.6   MCV 89 89   MCH 27.0 27.3   MCHC 30.3* 30.6*   RDW 14.1 14.0    248     INR  Recent Labs  Lab 01/02/18  2358   INR 1.07     EKG:         Echo:  Pending

## 2018-01-03 NOTE — H&P
[facility], [place]    History and Physical - Cardiology CSI Service       Date of Admission:  2018    Chief Complaint   Chest pain    History is obtained from the patient with the help of her daughter, and through chart review.      History of Present Illness   Katey Burrows is a 77 year old female with a past medical history most remarkable for diabetes type 2, hypertension, hyperlipidemia who presented to [facility] with a 30 minute history of chest pressure.  Patient states that the pain came on suddenly this morning while she was sitting, and spread to her right arm.  She states the pain was a pressure in that she never felt anything like it before.  Patient and her family were concerned, because the patient's daughter had recently  of a myocardial infarction.    After the patient's pain did not go away for 30 minutes, patient's family called EMS who brought her into the and emergency department.  Patient denies receiving nitroglycerin in route to hospital or in the outside emergency department, states that the pressure went away on its own.    In the [facility] emergency department, basic labs and imaging were done.  EKG demonstrated a right bundle branch block and initial troponin was negative with the second 1 2 hours later rising to 0.9.  Lower extremity ultrasounds  demonstrated a posterior tibial vein thrombosis.  Patient also had a VQ scan which was normal.  Patient was transferred to the [facility] for further stabilization and workup.    Patient denies fevers chills, chest pain, shortness of breath, abdominal pain, nausea, vomiting.  She also denies dizziness, lightheadedness, syncope.    Review of Systems   10 point review of systems was completed.  Pertinent positives and negatives are in the HPI otherwise all other items are negative.    Past Medical History    1.  Essential hypertension  2.  Diabetes type 2  3.  Hyperlipidemia    Past Surgical  History   Patient denies pertinent surgical history    Social History   Patient is from [place], she is currently in the [place] as her daughter unexpectedly  of myocardial infarction 2 months ago.      Family History   Both mother and daughter  of a myocardial infarction.    Prior to Admission Medications   None     Allergies   Allergies not on file    Physical Exam   Vital Signs: Temp: 97.9  F (36.6  C) Temp src: Oral BP: 160/69 Pulse: 90 Heart Rate: 88 Resp: 18 SpO2: 97 % O2 Device: None (Room air)    Weight: 0 lbs 0 oz    General Appearance: Elderly woman in no acute distress.  Eyes: Pupils equally reactive to light, extraocular movements intact  HEENT: Normocephalic, atraumatic, moist mucous membranes, no JVD, no thyroid megaly  Respiratory: Clear to auscultation bilaterally, no increased work of breathing  Cardiovascular: Regular rate and rhythm, normal S1-S2, no murmurs rubs or gallops  GI: Soft, nontender, nondistended  Lymph/Hematologic: No bruising  Genitourinary: Deferred  Skin: Healed wound in the left groin, from cellulitis debridement  Musculoskeletal: Warm, well-perfused, no edema  Neurologic: Alert and oriented ×4, cranial nerves II through XII intact  Psychiatric: Appropriate    Assessment & Plan   Katey Burrows is a 77 year old female with a past medical history most remarkable for diabetes type 2, hypertension, hyperlipidemia who presented to [facility] emergency department with unstable angina.    1.  Chest pain.  Most consistent with unstable angina.  -Troponin at [place] ED 0.03 --> 0.90   - Will repeat troponin on admission here  -Patient was given ASA, therapeutic lovenox prior to transfer at 730PM; will not require heparin drip tonight.  -VQ scan was normal prior to transfer, less likely PE  -N.p.o. for potential procedure in the morning   - Patient is nervous about potential procedure as she was recently admitted to a hospital in [place] for cellulitis and received surgical  debridement as part of her care. I discussed a potential procedure with patient tonight.  -Formal echo in the morning  -Fresh set of labs, fresh EKG, chest x-ray    2. Posterior Tibial Vein Thrombosis - Unclear etiology, most likely provoked in the setting of prolonged hospitalization prior to arriving in the [place].  - Patient is already anticoagulated with lovenox, will defer to day team for further treatment.    Chronic Conditions  1. HTN - Will await pharmacy medication reconciliation  2. DM2 - Started sliding scale insulin    Other  Diet: NPO for Medical/Clinical Reasons Except for: Meds  Fluids: None  DVT Prophylaxis: Already received therapeutic lovenox  Code Status: Full Code    Disposition Plan   Expected discharge: Tomorrow; recommended to prior living arrangement once ACS workup/rule out is complete.     Entered: Saurav Fan 01/02/2018, 11:38 PM   Information in the above section will display in the discharge planner report.    Patient will be formally staffed in the morning with Dr. Luciano Fan MD PhD  Internal Medicine Resident, PGY2  P: [phone]       Data   Data   No lab results found in last 7 days.  No results found for this or any previous visit (from the past 24 hour(s)).

## 2018-01-03 NOTE — PLAN OF CARE
"Problem: Patient Care Overview  Goal: Plan of Care/Patient Progress Review  Outcome: No Change  Care Provided: admit-0700    Neuro: A&Ox4.   Cardiac: SR w/ no ectopty. Pt denied SOB  & chest pain on shift.  /74 (BP Location: Left arm)  Pulse 90  Temp 97.8  F (36.6  C) (Oral)  Resp 18  Ht 1.803 m (5' 11\")  Wt 82.7 kg (182 lb 5.1 oz)  SpO2 96%  BMI 25.43 kg/m2  Respiratory: Sating >95% on RA.  GI/: Unmeasured UOP x1 upon arrival. No BM on shift.  Diet/appetite: NPO overnight in anticipation for possible procedures.  Activity:  Assist of 1 - beneficial to use walker for pts balance.  Pain: Pt denied presence of pain. Some radiating discomfort to r. Shoulder from previous in day.  Skin: No deficits noted, some bruising. +1 edema in ankles.  Lab: See provider notification for critical trop 2.512  IV: Heparin gtt started at 0700 @ 1000 units/hr. 10a recheck ordered for 1300.    R: Will continue to monitor pt closely and notify primary team with any changes.        "

## 2018-01-03 NOTE — PROCEDURES
CARDIAC CATH REPORT:   PROCEDURES PERFORMED:   Coronary Angiography  Percutaneous Coronary Intervention of proximal to distal LAD    PHYSICIANS:  Attending Physician: Luciano Baldwin MD  Interventional Cardiology Fellow: Hari Ramirez MD    INDICATION:  Katey Burrows is a 77 year old female with a past medical history most remarkable for diabetes type 2, hypertension, hyperlipidemia who presented to [facility] and found to have NSTEMI. She  presents on an urgent basis. The clinical presentation was NSTEMI (CCS IV).     DESCRIPTION:  1. Consent obtained with discussion of risks.  All questions were answered.  2. Sterile prep and procedure.  3. Location with Sheaths:   Lf Radial Arterial  6 Fr  10 cm [short]  4. Access: Local anesthetic with lidocaine.  A micropuncture 21 guage needle with ultrasound guidance was used to establish vascular access using a modified Seldinger technique.  5. Diagnostic Catheters:   4 Fr  JR4 and JL 4  6. Guiding Catheters:  6 Fr  XB LF 3.5 GC  6. Estimated blood loss: < 25 ml    MEDICATIONS:  The procedure was performed under conscious sedation for 76 minutes from 13:53 to 15:09.  The patient was assessed immediately before the first sedation medication was administered.  Midazolam 1 mg and Fentanyl 100 mcg were administered.  Heart rate, BP, respiration, oxygen saturation and patient responses were monitored throughout the procedure with the assistance of the RN under my supervision.  >> IA Nicardipine and IA NTG were administered to prophylax against radial artery spasm.  >> Antiplatelet Therapy: ASA 81 mg and Cangralor IV    Procedures:    CORONARY ANGIOGRAM:   1. Both coronary arteries arise from their respective cusps.  2. Dominance: Left  3. The LM is short and is without angiographic evidence of disease.   4. LAD: Type 3 [LAD supplies the entire apex].. The LAD gives rise to septal perforators, tiny caliber D1, D2, D3, D4 and D5.  There is diffuse disease from proximal to  "distal segment. There is 80% stenosis of the proximal to mid portion, and 70% in the distal segment.  5. LCX is a large caliber dominant vessel in the proximal to mid portion and distally it is a small caliber vessel. It gives rise to medium caliber OM1 and small caliber OM2, OM3 and OM4 vessels. It gives rise to PDA distally. There is 40% stenosis of the proximal portion of OM1, and 50% stenosis of the proximal portion of OM3 (small caliber vessel).  6. RCA is a small non-dominant vessel giving rise to acute marginal branches. There is 60% stenosis of the proximal RCA    PERCUTANEOUS CORONARY INTERVENTION:   Lesion #1:  LAD: proximal, mid and distal  A 6 Fr  XB LF 3.5 GC was positioned at the ostium of the LMCA.  >> IV UFH   U was administered to achieve anticoagulation.    A 0.014\" runthrough wire was advanced across the proximal to mid LAD lesion and positioned in the distal segment.  A 2.5x30 mm balloon was used to pre-dilate the lesion up to 16 luiz.  A 3.0x38mm  Synergy drug eluting stent was successfully deployed across the mid LAD lesion. There was NAVJOT 0 flow post stent placement in the mid to distal LAD. The stent balloon was removed and IC nitro and nicardipine was given. A 2.0x12 mm Mini Trex balloon was used to dilate the vessel distal to the stent.  The flow in the LAD improved to NAVJOT 3. The distal stenosis appeared more stenosed due to spasm or thrombus.  More nicardipine was given without benefit.  A 1.2 x 20 mm balloon was then advanced using a guideliner for additional support.  A 2.0 x 8 mm balloon was used to dilated the distal LAD lesion. A 2.82j24cl Synergy ARMANDO was then successfully deployed distal to, and overlapping, the first stent.  The overlapping portion of the stents and the length of the first stent were post-dilated with a 3.0 x 15 mm NC balloon.  A 3.5 x24 mm Synergy  drug eluting stent was successfully deployed in the proximal LAD proximal to, and overlapping, the first stent. The " stent was post-dilated with a 4.0 x 12 mm NC balloon.  Final angiography showed no evidence of perforation or dissection with residual stenosis of 0% and NAVJOT 3 flow.    Sheath Removal:  The left radial sheath was manually removed in the cardiac catheterization laboratory. TR band applied.    Contrast: Isovue, 210 ml     Fluoroscopy Time: 23.3 min    COMPLICATIONS:  1. None    SUMMARY:   Single vessel CAD  LAD   Successful deployment of drug eluting stents to the genc-ev-ielyot LAD                                                 PLAN:   >> ASA 81 mg qd and Ticagrelor 90 mg BID  >> Bedrest per protocol.  >> Continued medical management and lifestyle modification for cardiovascular risk factor optimization.   >> Return to the primary inpatient team for further evaluation and management.    The attending interventional cardiologist was present and supervised all critical aspects the procedure.      See CVIS report for final draft.    Hari Ramirez MD  Interventional Cardiology Fellow        ATTENDING ATTESTATION: I was present and supervised the cardiology fellow throughout the procedure and reviewed the findings with the fellow at the completion of the procedure.  I agree with the documentation above.    Luciano Baldwin MD, PhD  Interventional/Critical Care Cardiology  [phone]    January 3, 2018

## 2018-01-03 NOTE — PROVIDER NOTIFICATION
-------------------CRITICAL LAB VALUE-------------------    Lab Value: Troponin 2.512  Time of notification: 12:47 AM  MD notified: CSI [id number]  Patient status: Unchanged; denying SOB & chest pain.     Temp:  [97.9  F (36.6  C)] 97.9  F (36.6  C)  Pulse:  [90] 90  Heart Rate:  [88-90] 88  Resp:  [16-18] 18  BP: (150-160)/(69-70) 160/69  SpO2:  [96 %-97 %] 97 %    Orders received:  Repeat troponin ordered for 0600. Will continue to monitor.

## 2018-01-04 ENCOUNTER — TRANSFERRED RECORDS (OUTPATIENT)
Dept: HEALTH INFORMATION MANAGEMENT | Facility: CLINIC | Age: 78
End: 2018-01-04

## 2018-01-04 ENCOUNTER — CARE COORDINATION (OUTPATIENT)
Dept: CARDIOLOGY | Facility: CLINIC | Age: 78
End: 2018-01-04

## 2018-01-04 VITALS
SYSTOLIC BLOOD PRESSURE: 154 MMHG | HEIGHT: 59 IN | WEIGHT: 174.16 LBS | DIASTOLIC BLOOD PRESSURE: 76 MMHG | BODY MASS INDEX: 35.11 KG/M2 | HEART RATE: 67 BPM | RESPIRATION RATE: 18 BRPM | OXYGEN SATURATION: 98 % | TEMPERATURE: 97.5 F

## 2018-01-04 LAB
ANION GAP SERPL CALCULATED.3IONS-SCNC: 7 MMOL/L (ref 3–14)
BUN SERPL-MCNC: 20 MG/DL (ref 7–30)
CALCIUM SERPL-MCNC: 9.3 MG/DL (ref 8.5–10.1)
CHLORIDE SERPL-SCNC: 104 MMOL/L (ref 94–109)
CO2 SERPL-SCNC: 27 MMOL/L (ref 20–32)
CREAT SERPL-MCNC: 0.84 MG/DL (ref 0.52–1.04)
ERYTHROCYTE [DISTWIDTH] IN BLOOD BY AUTOMATED COUNT: 14.2 % (ref 10–15)
GFR SERPL CREATININE-BSD FRML MDRD: 66 ML/MIN/1.7M2
GLUCOSE BLDC GLUCOMTR-MCNC: 111 MG/DL (ref 70–99)
GLUCOSE SERPL-MCNC: 72 MG/DL (ref 70–99)
HCT VFR BLD AUTO: 34.5 % (ref 35–47)
HGB BLD-MCNC: 10.6 G/DL (ref 11.7–15.7)
INTERPRETATION ECG - MUSE: NORMAL
MCH RBC QN AUTO: 27.2 PG (ref 26.5–33)
MCHC RBC AUTO-ENTMCNC: 30.7 G/DL (ref 31.5–36.5)
MCV RBC AUTO: 89 FL (ref 78–100)
PLATELET # BLD AUTO: 208 10E9/L (ref 150–450)
POTASSIUM SERPL-SCNC: 4.3 MMOL/L (ref 3.4–5.3)
RBC # BLD AUTO: 3.9 10E12/L (ref 3.8–5.2)
SODIUM SERPL-SCNC: 138 MMOL/L (ref 133–144)
WBC # BLD AUTO: 9.7 10E9/L (ref 4–11)

## 2018-01-04 PROCEDURE — 36415 COLL VENOUS BLD VENIPUNCTURE: CPT | Performed by: HOSPITALIST

## 2018-01-04 PROCEDURE — 25000132 ZZH RX MED GY IP 250 OP 250 PS 637: Performed by: INTERNAL MEDICINE

## 2018-01-04 PROCEDURE — 99239 HOSP IP/OBS DSCHRG MGMT >30: CPT | Mod: GC | Performed by: INTERNAL MEDICINE

## 2018-01-04 PROCEDURE — 85027 COMPLETE CBC AUTOMATED: CPT | Performed by: HOSPITALIST

## 2018-01-04 PROCEDURE — 80048 BASIC METABOLIC PNL TOTAL CA: CPT | Performed by: HOSPITALIST

## 2018-01-04 PROCEDURE — 90662 IIV NO PRSV INCREASED AG IM: CPT | Performed by: INTERNAL MEDICINE

## 2018-01-04 PROCEDURE — 25000128 H RX IP 250 OP 636: Performed by: INTERNAL MEDICINE

## 2018-01-04 PROCEDURE — 25000132 ZZH RX MED GY IP 250 OP 250 PS 637: Performed by: HOSPITALIST

## 2018-01-04 PROCEDURE — 00000146 ZZHCL STATISTIC GLUCOSE BY METER IP

## 2018-01-04 RX ORDER — ATORVASTATIN CALCIUM 40 MG/1
40 TABLET, FILM COATED ORAL DAILY
Qty: 90 TABLET | Refills: 3 | Status: SHIPPED | OUTPATIENT
Start: 2018-01-05

## 2018-01-04 RX ORDER — METOPROLOL TARTRATE 25 MG/1
12.5 TABLET, FILM COATED ORAL 2 TIMES DAILY
Qty: 180 TABLET | Refills: 3 | Status: SHIPPED | OUTPATIENT
Start: 2018-01-04

## 2018-01-04 RX ORDER — NITROGLYCERIN 0.4 MG/1
TABLET SUBLINGUAL
Qty: 25 TABLET | Refills: 0 | Status: SHIPPED | OUTPATIENT
Start: 2018-01-04

## 2018-01-04 RX ORDER — ATORVASTATIN CALCIUM 40 MG/1
40 TABLET, FILM COATED ORAL DAILY
Status: DISCONTINUED | OUTPATIENT
Start: 2018-01-04 | End: 2018-01-04 | Stop reason: HOSPADM

## 2018-01-04 RX ADMIN — INFLUENZA A VIRUSA/MICHIGAN/45/2015 X-275 (H1N1) ANTIGEN (FORMALDEHYDE INACTIVATED), INFLUENZA A VIRUS A/HONG KONG/4801/2014 X-263B (H3N2) ANTIGEN (FORMALDEHYDE INACTIVATED), AND INFLUENZA B VIRUS B/BRISBANE/60/2008 ANTIGEN (FORMALDEHYDE INACTIVATED) 0.5 ML: 60; 60; 60 INJECTION, SUSPENSION INTRAMUSCULAR at 12:20

## 2018-01-04 RX ADMIN — ASPIRIN 81 MG CHEWABLE TABLET 81 MG: 81 TABLET CHEWABLE at 09:46

## 2018-01-04 RX ADMIN — TICAGRELOR 90 MG: 90 TABLET ORAL at 09:46

## 2018-01-04 RX ADMIN — Medication 12.5 MG: at 09:46

## 2018-01-04 RX ADMIN — ATORVASTATIN CALCIUM 40 MG: 40 TABLET, FILM COATED ORAL at 09:46

## 2018-01-04 ASSESSMENT — ACTIVITIES OF DAILY LIVING (ADL)
ADLS_ACUITY_SCORE: 12

## 2018-01-04 NOTE — PROGRESS NOTES
S-(situation): Patient discharged this afternoon, one hour after she got home she c'o chills and she did vomit a little. She has been shaking since she got home. She received a flu shot prior to her discharge. As we were talking on the phone daughter became more concerned with her mother's distress and stated that she felt she should just call 911.  Spoke with 6B and they stated that patient had none of those symptoms at discharge.    B-(background): discharged today post angioplasty and ARMANDO placement to LAD, She was discharged on aspirin, Brilinta, Lipitor, metoprolol, lisinopril and as needed sublingual on nitroglycerin.     A-(assessment): possible medication reaction    R-(recommendations): daughter decided she would take mother back to hospital and is calling 911.

## 2018-01-04 NOTE — DISCHARGE SUMMARY
[address] [address]  p: [phone]    Discharge Summary: Cardiology Service    Katey Burrows MRN# [id number]   YOB: 1940 Age: 77 year old       Admission Date: 1/2/2018  Discharge Date: 01/04/18      Discharge Diagnoses:  1.  Coronary artery disease  2.  NSTEMI  3.  Hypertension  4.  Hyperlipidemia  5.  Diabetes type II   6.  Deep vein thrombosis    Pertinent Procedures:  1.  Coronary angiogram    Consults:  None    Imaging with results:  Echocardiogram:  Interpretation Summary  Global and regional left ventricular function is normal with an EF of 55-60%.  No regional wall motion abnormalities are seen. Global right ventricular  function is normal.  Mild to moderate left atrial enlargement is present.  Moderate mitral annular calcification is present.  Mild to moderate aortic valve sclerosis is present.  The inferior vena cava was normal in size with preserved respiratory  variability.  No pericardial effusion is present.  Previous study not available for comparison.    Coronary Angiogram:  CORONARY ANGIOGRAM:   1. Both coronary arteries arise from their respective cusps.  2. Dominance: Left  3. The LM is short and is without angiographic evidence of disease.   4. LAD: Type 3 [LAD supplies the entire apex].. The LAD gives rise to septal perforators, tiny caliber D1, D2, D3, D4 and D5.  There is diffuse vessel from proximal to distal segment. There is 80% stenosis of the proximal to mid portion, and 70% in the distal segment.  5. LCX is a large caliber vessel in the proximal to mid portion and distally it is a small caliber vesse. It gives rise to medium caliber OM1 and small caliber OM2, OM3 and OM4 vessels. It gives rise to PDA distally. There is 40% stenosis of the proximal portion of OM1, and 50% stenosis of the proximal portion of OM3( small caliber vessel).  6. RCA gives rise to acute marginal branches. There is 60% stenosis  "of the proximal RCA     PERCUTANEOUS CORONARY INTERVENTION:   Lesion #1:  LAD: proximal, mid and distal  A 6 Fr  XB LF 3.5 GC was positioned at the ostium of the LMCA.  >> IV UFH   U was administered to achieve anticoagulation.     A 0.014\" runthrough wire was advanced across the proximal to mid LAD lesion and positioned in the distal segment.  A 2.5 x30 mm balloon was used to pre-dilate the lesion upto 16 luiz.  A 3.0 x38 mm  Synergy  drug eluting stent was successfully deployed across the mid LAD lesion with inflation to 16 luiz. There was NAVJOT 0 flow post stent placement in the mid to distal LAD. The stent balloon was removed and IC nitro and nicardipine was given. The flow in the LAD improved to NAVJOT 3. A 2.0 x 12 mm Mini Trex balloon was used to dilate the stent segment upto 12 luiz.  It was difficult to pass the balloon beyond the distal stenosis. A 1.2 x 20 mm balloon was then advanced and guideliner advanced to mid LAD segment.  A stent 2.25 x 24 mm Synergy stent was attempted to cross the distal segment was unsuccessful. A 2.0 x 8 mm balloon was used to dilated the distal LAD lesion. Then the stent was advanced to distal segment and deployed in overlapping to the mid LAD stent with inflation to 12 luiz.  The stents was post-dilated with a 3.0 x 15 mm NC balloon with 6 luiz in the distal segment, 8 luiz in the mid segment and 16 luiz in the proximal segment.  A 3.5 x24 mm Synergy  drug eluting stent was successfully deployed across the proximal LAD lesion with inflation to 14 luiz. The stents was post-dilated with a 4.0 x 12 mm NC balloon.  Final angiography showed no evidence of perforation or dissection with residual stenosis of 0% and NAVJOT 3 flow. There were NAVJOT 2 flow in the jailed diagonals. But they were small branches. No complications.    Other imaging studies:  None    Brief HPI:  This is a 77-year-old female who presented to an outside emergency room for evaluation of chest pain.  She never had chest pain " before.  She was evaluated there are noted not not have EKG changes but her troponin was positive.  She is transferred to the [facility] for further evaluation and treatment.  While there she was also noted to have a deep vein thrombosis.  VQ scan was negative for PE    Hospital Course by Diagnosis:  Non-ST segment elevation myocardial infarction: Patient presented and was evaluated she is On heparin drip and nitro drip.  Troponin trended upwards.  She was given full dose aspirin.  We discussed the different options in terms of evaluating her coronary disease but given the positive troponin we felt strongly that she undergo coronary angiography, particularly given her risk factors including age and diabetes.  On 1/3/2018 she was taken to the cardiac catheterization laboratory where she was found to have disease as detailed below.  She underwent stenting of the LAD.  She was placed on aspirin and Brilinta.  Her post PCI course was complicated by minimal chest pain after the procedure that resolved by the next morning.  She will be discharged on aspirin, Brilinta, Lipitor, metoprolol, lisinopril and as needed sublingual on nitroglycerin.  She is from [place] and will follow up there locally in 1-3 months for her cardiology follow-up.  Should she have any events or concerns in the interim while she still here and states she was given the clinical contact information.      Condition on discharge  Temp:  [97.4  F (36.3  C)-98.4  F (36.9  C)] 98.1  F (36.7  C)  Pulse:  [67-71] 67  Heart Rate:  [62-71] 68  Resp:  [16-18] 18  BP: (126-159)/(63-82) 141/66  SpO2:  [94 %-100 %] 97 %  General: Alert, interactive, NAD  Eyes: sclera an without icteric, EOMI  Neck: JVP without elevation, carotid 2+ bilaterally  Cardiovascular: regular rate and rhythm, normal S1 and S2, no murmurs, gallops, or rubs  Resp: clear to auscultation bilaterally, no rales, wheezes, or rhonchi  GI: Soft, nontender, nondistended. +BS.  No HSM or  masses, no rebound or guarding.  Extremities: Without edema, no cyanosis or clubbing, dorsalis pedis and posterior tibialis pulses 2+ bilaterally  Skin: Warm and dry, no jaundice or rash  Neuro: CN 2-12 intact, moves all extremities equally  Psych: Alert & oriented x 3      Medication Changes:  Patient will resume her home diabetic medications.  She will start the following: Aspirin, Brilinta, metoprolol, lisinopril, Lipitor and as needed sublingual nitroglycerin    Discharge medications:   Current Discharge Medication List      START taking these medications    Details   nitroGLYcerin (NITROSTAT) 0.4 MG sublingual tablet For chest pain place 1 tablet under the tongue every 5 minutes for 3 doses. If symptoms persist 5 minutes after 1st dose call 911.  Qty: 25 tablet, Refills: 0    Comments: Take one tablet as needed under the tongue every 5 minutes as needed for chest discomfort.  Associated Diagnoses: ACS (acute coronary syndrome) (H)      ticagrelor (BRILINTA) 90 MG tablet Take 1 tablet (90 mg) by mouth every 12 hours  Qty: 180 tablet, Refills: 3    Comments: First dose evening of 1/4/18.  Associated Diagnoses: ACS (acute coronary syndrome) (H)      atorvastatin (LIPITOR) 40 MG tablet Take 1 tablet (40 mg) by mouth daily  Qty: 90 tablet, Refills: 3    Associated Diagnoses: ACS (acute coronary syndrome) (H)      metoprolol (LOPRESSOR) 25 MG tablet Take 0.5 tablets (12.5 mg) by mouth 2 times daily  Qty: 180 tablet, Refills: 3    Associated Diagnoses: ACS (acute coronary syndrome) (H)         CONTINUE these medications which have NOT CHANGED    Details   aspirin (ASPIRIN 81) 81 MG chewable tablet Take 81 mg by mouth daily      Exenatide ER 2 MG/0.85ML AUIJ Inject 2 mg Subcutaneous once a week      olmesartan-hydrochlorothiazide (BENICAR) 20-12.5 MG per tablet Take 1 tablet by mouth      Multiple Vitamin (THERAGRAN OR) Take 1 tablet by mouth daily      metFORMIN (GLUCOPHAGE) 1000 MG tablet Take 1,000 mg by mouth 2  times daily (with meals)      !! NONFORMULARY 60 mg Gliclazide 60mg tabs      !! NONFORMULARY rcanidipina 20mg tabs       !! - Potential duplicate medications found. Please discuss with provider.      STOP taking these medications       aspirin - buffered (ASCRIPTIN) 325 MG TABS tablet Comments:   Reason for Stopping:         Simvastatin (ZOCOR PO) Comments:   Reason for Stopping:               Labs or imaging requiring follow-up after discharge:  None      Follow-up:  Patient was given the contact information for the clinical follow-up.  [facility].  She currently lives in [place] and will return there by the end of the month and will follow-up locally.    Code status:  Full    Pt was seen by, and discharge plan discussed with Dr. Luciano Lanier MD  Cardiovascular Disease Fellow    No care team member to display

## 2018-01-04 NOTE — PROVIDER NOTIFICATION
Time of notification: 6:25 PM  MD notified: Yakelin ESPINOSA CNP, with CSI  Patient status: C/o chest pain radiating to left shoulder & left back/shoulderblade and states more difficulty w/ breathing (maintaining adequate O2 sats at 97%).  Temp:  [97.4  F (36.3  C)-97.9  F (36.6  C)] 97.4  F (36.3  C)  Pulse:  [67-90] 67  Heart Rate:  [62-90] 62  Resp:  [16-18] 18  BP: (135-160)/(64-82) 145/82  SpO2:  [94 %-99 %] 99 %  Orders received: EKG 12 lead; instructed to admin sublingual nitroglycerin per CSI. Providers assessed at bedside, awaiting orders for pain meds. Continue to monitor.

## 2018-01-04 NOTE — PLAN OF CARE
Problem: Patient Care Overview  Goal: Plan of Care/Patient Progress Review  Outcome: No Change  Shift: [id number]     Neuro: A&Ox4.   Cardiac: Right Bundle Branch Block; HR 60's-80. Complaining of difficulty breathing & chest pain radiating to Left shoulder/left scapula just after 1800, gave nitro x1 per CSI and EKG obtained, little to no relief of should/back pain with nitro and chest pain seemed to be intermittent (see provider notification)  Respiratory: Sating >95% on RA.  GI/: Voiding adequate amounts. No BM this shift.  Diet/appetite: Regular diet for dinner.  Activity:  Assist of 1 - beneficial to use walker for pts balance.  Pain: Awaiting additional meds at shift change for chest pain radiating to Left should & left scapula (pain unrelieved with nitro x1)   Skin: No deficits noted, some bruising. +1 edema in ankles.  Lab: Trop 1.736 this morning  IV: Heparin gtt d/c'ed @1200 prior to angiogram (no restart).       R: Will continue to monitor pt closely and notify primary team with any changes.

## 2018-01-04 NOTE — DISCHARGE INSTRUCTIONS
Going Home after Coronary Angioplasty or Stent Placement       Name: Katey Burrows  Medical Record Number:  [id number]  Today's Date: January 4, 2018        For 24 hours:         Have an adult stay with you for 24 hours.         Relax and take it easy.         Drink plenty of fluids.         You may eat your normal diet, unless your doctor tells you otherwise.         Do NOT make any important or legal decisions.         Do NOT drive or operate machines at home or at work.         Do NOT drink alcohol.      Do NOT smoke.     Medicines:         If you have begun Plavix (clopidogrel), Effient (prasugrel), or Brilinta (ticagrelor), do not stop taking it until you talk to your heart doctor (cardiologist).         If you are on metformin (Glucophage), do not restart it until you have blood tests (within 2 to 3 days after discharge). When your doctor tells you it is safe, you may restart the metformin.         If you have stopped any other medicines, check with your nurse or provider about when to restart them.    Care of wrist or arm site:         It is normal to have soreness at the puncture site and mild tingling in your hand for up to 3 days.           Remove the Band-Aid after 24 hours. If there is minor oozing, apply another Band-aid and remove it after 12 hours.          Do NOT take a bath, or use a hot tub or pool for the next 48 hours. You may shower.          It is normal to have a small bruise.  There should not be a lump at the site.         Do not scrub the site.         Do not use lotion or powder near the puncture site for 3 days.         For 2 days, do not use your hand or arm to support your weight (such as rising from a chair) or bend your wrist (such as lifting a garage door).         For 2 days, do not lift more than 5 pounds or exercise your arm (tennis, golf or bowling).    If you start bleeding from the site in your arm: Sit down and press firmly on the site with your fingers for 10 minutes. Call  your doctor as soon as you can.      Call 911 right away if you have bleeding that is heavy or does not stop.     Call your doctor if:         You have a large or growing hard lump around the site.         The site is red, swollen, hot or tender.         Blood or fluid is draining from the site.         You have chills or a fever greater than 101 F (38 C).         Your leg or arm turns bluish, feels numb or cool.         You have hives, a rash or unusual itching.     Due cardiac rehab in [place].    ADDITIONAL INSTRUCTIONS: None    [facility] [place]:   [phone] (7 days a week)      Cardiology Fellow on call (24 hours per day) at [facility] [place]:   [phone] (ask for Cardiology Fellow on call)      Number where we can reach you:  ____________

## 2018-01-04 NOTE — PROGRESS NOTES
DISCHARGE                         No discharge date for patient encounter.  ----------------------------------------------------------------------------  Discharged to: Home  Via: private transportation  Accompanied by: Family  Discharge Instructions: diet, activity, medications, follow up appointments, when to call the MD, aftercare instructions.  Prescriptions: To be filled by discharg pharmacy; medication list reviewed & sent with pt  Follow Up Appointments:  information given to follow up with cardiologist in [place]  Belongings: All sent with pt  IV: d/c'd  Telemetry: d/c'd  Pt exhibits understanding of above discharge instructions; all questions answered.    Discharge Paperwork: Signed, copied, and sent home with patient.

## 2018-01-04 NOTE — PLAN OF CARE
"Problem: Patient Care Overview  Goal: Plan of Care/Patient Progress Review  Outcome: Improving  /66  Pulse 67  Temp 98.1  F (36.7  C) (Oral)  Resp 18  Ht 1.499 m (4' 11\")  Wt 82.7 kg (182 lb 5.1 oz)  SpO2 97%  BMI 36.82 kg/m2  Neuro: A&Ox4. Follows commands. PERRLA.  Cardiac: Rt BBB. HR 60's. SBP 130s-140s. Afebrile.   Respiratory: Sating in 90's on RA. Placed 1 L NC overnight for comfort.  GI/: Adequate urine output. BM X0.  Diet/appetite: Tolerating regular diet. Eating well.  Activity:  Assist of 1 to the bathroom.  Pain: At acceptable level on current regimen. No chest pain noted overnight.  Skin: Intact, no new deficits noted. Lft wrist (previousTR band site) has some bruising but intact.  LDA's: RT PIV SL.    Plan: Continue with POC. Notify primary team with changes.      "

## 2018-01-05 ENCOUNTER — APPOINTMENT (OUTPATIENT)
Dept: GENERAL RADIOLOGY | Facility: CLINIC | Age: 78
End: 2018-01-05
Attending: INTERNAL MEDICINE

## 2018-01-05 ENCOUNTER — HOSPITAL ENCOUNTER (OUTPATIENT)
Facility: CLINIC | Age: 78
Setting detail: OBSERVATION
Discharge: HOME | End: 2018-01-05
Attending: INTERNAL MEDICINE | Admitting: INTERNAL MEDICINE

## 2018-01-05 ENCOUNTER — CARE COORDINATION (OUTPATIENT)
Dept: CARE COORDINATION | Facility: CLINIC | Age: 78
End: 2018-01-05

## 2018-01-05 VITALS
SYSTOLIC BLOOD PRESSURE: 107 MMHG | DIASTOLIC BLOOD PRESSURE: 60 MMHG | HEIGHT: 59 IN | BODY MASS INDEX: 34.56 KG/M2 | WEIGHT: 171.4 LBS | OXYGEN SATURATION: 97 % | RESPIRATION RATE: 18 BRPM | TEMPERATURE: 98.6 F

## 2018-01-05 DIAGNOSIS — I82.4Z9 ACUTE VENOUS EMBOLISM AND THROMBOSIS OF DEEP VESSELS OF DISTAL LOWER EXTREMITY, UNSPECIFIED LATERALITY (H): Primary | ICD-10-CM

## 2018-01-05 DIAGNOSIS — I24.9 ACS (ACUTE CORONARY SYNDROME) (H): ICD-10-CM

## 2018-01-05 PROBLEM — R50.9 FEVER: Status: ACTIVE | Noted: 2018-01-05

## 2018-01-05 LAB
ALBUMIN SERPL-MCNC: 2.7 G/DL (ref 3.4–5)
ALP SERPL-CCNC: 69 U/L (ref 40–150)
ALT SERPL W P-5'-P-CCNC: 20 U/L (ref 0–50)
ANION GAP SERPL CALCULATED.3IONS-SCNC: 11 MMOL/L (ref 3–14)
ANION GAP SERPL CALCULATED.3IONS-SCNC: 8 MMOL/L (ref 3–14)
AST SERPL W P-5'-P-CCNC: 50 U/L (ref 0–45)
BASOPHILS # BLD AUTO: 0 10E9/L (ref 0–0.2)
BASOPHILS NFR BLD AUTO: 0.2 %
BILIRUB SERPL-MCNC: 0.6 MG/DL (ref 0.2–1.3)
BUN SERPL-MCNC: 20 MG/DL (ref 7–30)
BUN SERPL-MCNC: 22 MG/DL (ref 7–30)
CALCIUM SERPL-MCNC: 8.6 MG/DL (ref 8.5–10.1)
CALCIUM SERPL-MCNC: 9 MG/DL (ref 8.5–10.1)
CHLORIDE SERPL-SCNC: 105 MMOL/L (ref 94–109)
CHLORIDE SERPL-SCNC: 105 MMOL/L (ref 94–109)
CO2 SERPL-SCNC: 26 MMOL/L (ref 20–32)
CO2 SERPL-SCNC: 27 MMOL/L (ref 20–32)
CREAT SERPL-MCNC: 1.35 MG/DL (ref 0.52–1.04)
CREAT SERPL-MCNC: 1.41 MG/DL (ref 0.52–1.04)
DIFFERENTIAL METHOD BLD: ABNORMAL
EOSINOPHIL # BLD AUTO: 0 10E9/L (ref 0–0.7)
EOSINOPHIL NFR BLD AUTO: 0.1 %
ERYTHROCYTE [DISTWIDTH] IN BLOOD BY AUTOMATED COUNT: 14.3 % (ref 10–15)
GFR SERPL CREATININE-BSD FRML MDRD: 36 ML/MIN/1.7M2
GFR SERPL CREATININE-BSD FRML MDRD: 38 ML/MIN/1.7M2
GLUCOSE BLDC GLUCOMTR-MCNC: 106 MG/DL (ref 70–99)
GLUCOSE BLDC GLUCOMTR-MCNC: 141 MG/DL (ref 70–99)
GLUCOSE BLDC GLUCOMTR-MCNC: 85 MG/DL (ref 70–99)
GLUCOSE BLDC GLUCOMTR-MCNC: 97 MG/DL (ref 70–99)
GLUCOSE SERPL-MCNC: 140 MG/DL (ref 70–99)
GLUCOSE SERPL-MCNC: 68 MG/DL (ref 70–99)
HCT VFR BLD AUTO: 34.1 % (ref 35–47)
HGB BLD-MCNC: 10.6 G/DL (ref 11.7–15.7)
IMM GRANULOCYTES # BLD: 0 10E9/L (ref 0–0.4)
IMM GRANULOCYTES NFR BLD: 0.2 %
INTERPRETATION ECG - MUSE: NORMAL
LACTATE BLD-SCNC: 1.1 MMOL/L (ref 0.7–2)
LYMPHOCYTES # BLD AUTO: 0.5 10E9/L (ref 0.8–5.3)
LYMPHOCYTES NFR BLD AUTO: 4.1 %
MCH RBC QN AUTO: 27.3 PG (ref 26.5–33)
MCHC RBC AUTO-ENTMCNC: 31.1 G/DL (ref 31.5–36.5)
MCV RBC AUTO: 88 FL (ref 78–100)
MONOCYTES # BLD AUTO: 0.6 10E9/L (ref 0–1.3)
MONOCYTES NFR BLD AUTO: 4.7 %
MRSA DNA SPEC QL NAA+PROBE: NEGATIVE
NEUTROPHILS # BLD AUTO: 12 10E9/L (ref 1.6–8.3)
NEUTROPHILS NFR BLD AUTO: 90.7 %
NRBC # BLD AUTO: 0 10*3/UL
NRBC BLD AUTO-RTO: 0 /100
PLATELET # BLD AUTO: 206 10E9/L (ref 150–450)
POTASSIUM SERPL-SCNC: 3.8 MMOL/L (ref 3.4–5.3)
POTASSIUM SERPL-SCNC: 4 MMOL/L (ref 3.4–5.3)
PROCALCITONIN SERPL-MCNC: 11.78 NG/ML
PROT SERPL-MCNC: 6.4 G/DL (ref 6.8–8.8)
RBC # BLD AUTO: 3.88 10E12/L (ref 3.8–5.2)
SODIUM SERPL-SCNC: 140 MMOL/L (ref 133–144)
SODIUM SERPL-SCNC: 142 MMOL/L (ref 133–144)
SPECIMEN SOURCE: NORMAL
TROPONIN I SERPL-MCNC: 7.42 UG/L (ref 0–0.04)
TROPONIN I SERPL-MCNC: 7.64 UG/L (ref 0–0.04)
WBC # BLD AUTO: 13.2 10E9/L (ref 4–11)

## 2018-01-05 PROCEDURE — 87641 MR-STAPH DNA AMP PROBE: CPT | Performed by: INTERNAL MEDICINE

## 2018-01-05 PROCEDURE — 25000128 H RX IP 250 OP 636: Performed by: INTERNAL MEDICINE

## 2018-01-05 PROCEDURE — 87640 STAPH A DNA AMP PROBE: CPT | Performed by: INTERNAL MEDICINE

## 2018-01-05 PROCEDURE — A9270 NON-COVERED ITEM OR SERVICE: HCPCS | Mod: GY | Performed by: HOSPITALIST

## 2018-01-05 PROCEDURE — 85025 COMPLETE CBC W/AUTO DIFF WBC: CPT | Performed by: INTERNAL MEDICINE

## 2018-01-05 PROCEDURE — 25000128 H RX IP 250 OP 636: Performed by: HOSPITALIST

## 2018-01-05 PROCEDURE — 40000556 ZZH STATISTIC PERIPHERAL IV START W US GUIDANCE

## 2018-01-05 PROCEDURE — 99219 ZZC INITIAL OBSERVATION CARE,LEVL II: CPT | Mod: GC | Performed by: INTERNAL MEDICINE

## 2018-01-05 PROCEDURE — 25000132 ZZH RX MED GY IP 250 OP 250 PS 637: Mod: GY | Performed by: HOSPITALIST

## 2018-01-05 PROCEDURE — 25000132 ZZH RX MED GY IP 250 OP 250 PS 637: Mod: GY | Performed by: INTERNAL MEDICINE

## 2018-01-05 PROCEDURE — 36415 COLL VENOUS BLD VENIPUNCTURE: CPT | Performed by: INTERNAL MEDICINE

## 2018-01-05 PROCEDURE — 80053 COMPREHEN METABOLIC PANEL: CPT | Performed by: INTERNAL MEDICINE

## 2018-01-05 PROCEDURE — 84145 PROCALCITONIN (PCT): CPT | Performed by: INTERNAL MEDICINE

## 2018-01-05 PROCEDURE — 84484 ASSAY OF TROPONIN QUANT: CPT | Performed by: INTERNAL MEDICINE

## 2018-01-05 PROCEDURE — 87040 BLOOD CULTURE FOR BACTERIA: CPT | Performed by: INTERNAL MEDICINE

## 2018-01-05 PROCEDURE — G0378 HOSPITAL OBSERVATION PER HR: HCPCS

## 2018-01-05 PROCEDURE — 00000146 ZZHCL STATISTIC GLUCOSE BY METER IP

## 2018-01-05 PROCEDURE — 93010 ELECTROCARDIOGRAM REPORT: CPT | Performed by: INTERNAL MEDICINE

## 2018-01-05 PROCEDURE — 80048 BASIC METABOLIC PNL TOTAL CA: CPT | Performed by: INTERNAL MEDICINE

## 2018-01-05 PROCEDURE — A9270 NON-COVERED ITEM OR SERVICE: HCPCS | Mod: GY | Performed by: INTERNAL MEDICINE

## 2018-01-05 PROCEDURE — 71045 X-RAY EXAM CHEST 1 VIEW: CPT

## 2018-01-05 PROCEDURE — 83605 ASSAY OF LACTIC ACID: CPT | Performed by: INTERNAL MEDICINE

## 2018-01-05 RX ORDER — PROCHLORPERAZINE 25 MG
12.5 SUPPOSITORY, RECTAL RECTAL EVERY 12 HOURS PRN
Status: DISCONTINUED | OUTPATIENT
Start: 2018-01-05 | End: 2018-01-05 | Stop reason: HOSPADM

## 2018-01-05 RX ORDER — METOCLOPRAMIDE HYDROCHLORIDE 5 MG/ML
5 INJECTION INTRAMUSCULAR; INTRAVENOUS EVERY 6 HOURS PRN
Status: DISCONTINUED | OUTPATIENT
Start: 2018-01-05 | End: 2018-01-05 | Stop reason: HOSPADM

## 2018-01-05 RX ORDER — ONDANSETRON 2 MG/ML
4 INJECTION INTRAMUSCULAR; INTRAVENOUS EVERY 6 HOURS PRN
Status: DISCONTINUED | OUTPATIENT
Start: 2018-01-05 | End: 2018-01-05 | Stop reason: HOSPADM

## 2018-01-05 RX ORDER — POTASSIUM CHLORIDE 29.8 MG/ML
20 INJECTION INTRAVENOUS
Status: DISCONTINUED | OUTPATIENT
Start: 2018-01-05 | End: 2018-01-05 | Stop reason: HOSPADM

## 2018-01-05 RX ORDER — NITROGLYCERIN 0.4 MG/1
0.4 TABLET SUBLINGUAL EVERY 5 MIN PRN
Status: DISCONTINUED | OUTPATIENT
Start: 2018-01-05 | End: 2018-01-05 | Stop reason: HOSPADM

## 2018-01-05 RX ORDER — METOCLOPRAMIDE 5 MG/1
5 TABLET ORAL EVERY 6 HOURS PRN
Status: DISCONTINUED | OUTPATIENT
Start: 2018-01-05 | End: 2018-01-05 | Stop reason: HOSPADM

## 2018-01-05 RX ORDER — POTASSIUM CHLORIDE 7.45 MG/ML
10 INJECTION INTRAVENOUS
Status: DISCONTINUED | OUTPATIENT
Start: 2018-01-05 | End: 2018-01-05 | Stop reason: HOSPADM

## 2018-01-05 RX ORDER — DEXTROSE MONOHYDRATE 25 G/50ML
25-50 INJECTION, SOLUTION INTRAVENOUS
Status: DISCONTINUED | OUTPATIENT
Start: 2018-01-05 | End: 2018-01-05 | Stop reason: HOSPADM

## 2018-01-05 RX ORDER — ACETAMINOPHEN 325 MG/1
650 TABLET ORAL EVERY 4 HOURS PRN
Status: DISCONTINUED | OUTPATIENT
Start: 2018-01-05 | End: 2018-01-05 | Stop reason: HOSPADM

## 2018-01-05 RX ORDER — MAGNESIUM SULFATE HEPTAHYDRATE 40 MG/ML
4 INJECTION, SOLUTION INTRAVENOUS EVERY 4 HOURS PRN
Status: DISCONTINUED | OUTPATIENT
Start: 2018-01-05 | End: 2018-01-05 | Stop reason: HOSPADM

## 2018-01-05 RX ORDER — POTASSIUM CHLORIDE 750 MG/1
20-40 TABLET, EXTENDED RELEASE ORAL
Status: DISCONTINUED | OUTPATIENT
Start: 2018-01-05 | End: 2018-01-05 | Stop reason: HOSPADM

## 2018-01-05 RX ORDER — LANOLIN ALCOHOL/MO/W.PET/CERES
3 CREAM (GRAM) TOPICAL
Status: DISCONTINUED | OUTPATIENT
Start: 2018-01-05 | End: 2018-01-05 | Stop reason: HOSPADM

## 2018-01-05 RX ORDER — AMOXICILLIN 250 MG
1 CAPSULE ORAL 2 TIMES DAILY PRN
Status: DISCONTINUED | OUTPATIENT
Start: 2018-01-05 | End: 2018-01-05 | Stop reason: HOSPADM

## 2018-01-05 RX ORDER — HEPARIN SODIUM 5000 [USP'U]/.5ML
5000 INJECTION, SOLUTION INTRAVENOUS; SUBCUTANEOUS EVERY 12 HOURS
Status: DISCONTINUED | OUTPATIENT
Start: 2018-01-05 | End: 2018-01-05

## 2018-01-05 RX ORDER — ATORVASTATIN CALCIUM 40 MG/1
40 TABLET, FILM COATED ORAL DAILY
Status: DISCONTINUED | OUTPATIENT
Start: 2018-01-05 | End: 2018-01-05 | Stop reason: HOSPADM

## 2018-01-05 RX ORDER — AMOXICILLIN 250 MG
2 CAPSULE ORAL 2 TIMES DAILY PRN
Status: DISCONTINUED | OUTPATIENT
Start: 2018-01-05 | End: 2018-01-05 | Stop reason: HOSPADM

## 2018-01-05 RX ORDER — PROCHLORPERAZINE MALEATE 5 MG
5 TABLET ORAL EVERY 6 HOURS PRN
Status: DISCONTINUED | OUTPATIENT
Start: 2018-01-05 | End: 2018-01-05 | Stop reason: HOSPADM

## 2018-01-05 RX ORDER — ASPIRIN 81 MG/1
81 TABLET, CHEWABLE ORAL DAILY
Status: DISCONTINUED | OUTPATIENT
Start: 2018-01-05 | End: 2018-01-05

## 2018-01-05 RX ORDER — NICOTINE POLACRILEX 4 MG
15-30 LOZENGE BUCCAL
Status: DISCONTINUED | OUTPATIENT
Start: 2018-01-05 | End: 2018-01-05 | Stop reason: HOSPADM

## 2018-01-05 RX ORDER — NALOXONE HYDROCHLORIDE 0.4 MG/ML
.1-.4 INJECTION, SOLUTION INTRAMUSCULAR; INTRAVENOUS; SUBCUTANEOUS
Status: DISCONTINUED | OUTPATIENT
Start: 2018-01-05 | End: 2018-01-05 | Stop reason: HOSPADM

## 2018-01-05 RX ORDER — POTASSIUM CL/LIDO/0.9 % NACL 10MEQ/0.1L
10 INTRAVENOUS SOLUTION, PIGGYBACK (ML) INTRAVENOUS
Status: DISCONTINUED | OUTPATIENT
Start: 2018-01-05 | End: 2018-01-05 | Stop reason: HOSPADM

## 2018-01-05 RX ORDER — POTASSIUM CHLORIDE 1.5 G/1.58G
20-40 POWDER, FOR SOLUTION ORAL
Status: DISCONTINUED | OUTPATIENT
Start: 2018-01-05 | End: 2018-01-05 | Stop reason: HOSPADM

## 2018-01-05 RX ORDER — ONDANSETRON 4 MG/1
4 TABLET, ORALLY DISINTEGRATING ORAL EVERY 6 HOURS PRN
Status: DISCONTINUED | OUTPATIENT
Start: 2018-01-05 | End: 2018-01-05 | Stop reason: HOSPADM

## 2018-01-05 RX ADMIN — METOPROLOL TARTRATE 12.5 MG: 25 TABLET, FILM COATED ORAL at 08:33

## 2018-01-05 RX ADMIN — APIXABAN 10 MG: 5 TABLET, FILM COATED ORAL at 10:00

## 2018-01-05 RX ADMIN — HEPARIN SODIUM 5000 UNITS: 5000 INJECTION, SOLUTION INTRAVENOUS; SUBCUTANEOUS at 04:55

## 2018-01-05 RX ADMIN — SODIUM CHLORIDE 500 ML: 9 INJECTION, SOLUTION INTRAVENOUS at 03:53

## 2018-01-05 RX ADMIN — TICAGRELOR 90 MG: 90 TABLET ORAL at 08:33

## 2018-01-05 RX ADMIN — SODIUM CHLORIDE, POTASSIUM CHLORIDE, SODIUM LACTATE AND CALCIUM CHLORIDE 500 ML: 600; 310; 30; 20 INJECTION, SOLUTION INTRAVENOUS at 06:47

## 2018-01-05 RX ADMIN — ATORVASTATIN CALCIUM 40 MG: 40 TABLET, FILM COATED ORAL at 08:33

## 2018-01-05 RX ADMIN — SODIUM CHLORIDE 1000 ML: 9 INJECTION, SOLUTION INTRAVENOUS at 11:22

## 2018-01-05 ASSESSMENT — ACTIVITIES OF DAILY LIVING (ADL)
RETIRED_EATING: 0-->INDEPENDENT
BATHING: 0-->INDEPENDENT
WHICH_OF_THE_ABOVE_FUNCTIONAL_RISKS_HAD_A_RECENT_ONSET_OR_CHANGE?: AMBULATION;TRANSFERRING;TOILETING;BATHING;DRESSING
DRESS: 0-->INDEPENDENT
TOILETING: 2 - ASSISTIVE PERSON
EATING: 0 - INDEPENDENT
FALL_HISTORY_WITHIN_LAST_SIX_MONTHS: NO
SWALLOWING: 0-->SWALLOWS FOODS/LIQUIDS WITHOUT DIFFICULTY
CHANGE_IN_FUNCTIONAL_STATUS_SINCE_ONSET_OF_CURRENT_ILLNESS/INJURY: YES
AMBULATION: 2 - ASSISTIVE PERSON
TRANSFERRING: 2 - ASSISTIVE PERSON
DRESS: 2 - ASSISTIVE PERSON
RETIRED_COMMUNICATION: 0-->UNDERSTANDS/COMMUNICATES WITHOUT DIFFICULTY
BATHING: 2 - ASSISTIVE PERSON
AMBULATION: 0-->INDEPENDENT
TOILETING: 0-->INDEPENDENT
COMMUNICATION: 0 - UNDERSTANDS/COMMUNICATES WITHOUT DIFFICULTY
COGNITION: 0 - NO COGNITION ISSUES REPORTED
SWALLOWING: 0 - SWALLOWS FOODS/LIQUIDS WITHOUT DIFFICULTY
TRANSFERRING: 0-->INDEPENDENT

## 2018-01-05 ASSESSMENT — PAIN DESCRIPTION - DESCRIPTORS
DESCRIPTORS: ACHING

## 2018-01-05 NOTE — IP AVS SNAPSHOT
[address] [address]    Phone:  [phone]                                       After Visit Summary   1/5/2018    Katey Burrows    MRN: [id number]           After Visit Summary Signature Page     I have received my discharge instructions, and my questions have been answered. I have discussed any challenges I see with this plan with the nurse or doctor.    ..........................................................................................................................................  Patient/Patient Representative Signature      ..........................................................................................................................................  Patient Representative Print Name and Relationship to Patient    ..................................................               ................................................  Date                                            Time    ..........................................................................................................................................  Reviewed by Signature/Title    ...................................................              ..............................................  Date                                                            Time

## 2018-01-05 NOTE — DISCHARGE INSTRUCTIONS
Going Home after Coronary Angioplasty or Stent Placement       Name: Katey Burrows  Medical Record Number:  [id number]  Today's Date: January 5, 2018        For 24 hours:         Have an adult stay with you for 24 hours.         Relax and take it easy.         Drink plenty of fluids.         You may eat your normal diet, unless your doctor tells you otherwise.         Do NOT make any important or legal decisions.         Do NOT drive or operate machines at home or at work.         Do NOT drink alcohol.      Do NOT smoke.     Medicines:         If you have begun Plavix (clopidogrel), Effient (prasugrel), or Brilinta (ticagrelor), do not stop taking it until you talk to your heart doctor (cardiologist).         If you are on metformin (Glucophage), do not restart it until you have blood tests (within 2 to 3 days after discharge). When your doctor tells you it is safe, you may restart the metformin.         If you have stopped any other medicines, check with your nurse or provider about when to restart them.    Care of wrist or arm site:         It is normal to have soreness at the puncture site and mild tingling in your hand for up to 3 days.           Remove the Band-Aid after 24 hours. If there is minor oozing, apply another Band-aid and remove it after 12 hours.          Do NOT take a bath, or use a hot tub or pool for the next 48 hours. You may shower.          It is normal to have a small bruise.  There should not be a lump at the site.         Do not scrub the site.         Do not use lotion or powder near the puncture site for 3 days.         For 2 days, do not use your hand or arm to support your weight (such as rising from a chair) or bend your wrist (such as lifting a garage door).         For 2 days, do not lift more than 5 pounds or exercise your arm (tennis, golf or bowling).    If you start bleeding from the site in your arm: Sit down and press firmly on the site with your fingers for 10 minutes. Call  your doctor as soon as you can.      Call 911 right away if you have bleeding that is heavy or does not stop.     Call your doctor if:         You have a large or growing hard lump around the site.         The site is red, swollen, hot or tender.         Blood or fluid is draining from the site.         You have chills or a fever greater than 101 F (38 C).         Your leg or arm turns bluish, feels numb or cool.         You have hives, a rash or unusual itching.         ADDITIONAL INSTRUCTIONS: None    [facility] [place]:   [phone] (7 days a week)      Cardiology Fellow on call (24 hours per day) at [facility]:   [phone] (ask for Cardiology Fellow on call)      Number where we can reach you:  ____________

## 2018-01-05 NOTE — DISCHARGE SUMMARY
"    [address] [address]  p: [phone]    Discharge Summary: Cardiology Service    Katey Burrows MRN# [id number]   YOB: 1940 Age: 77 year old       Admission Date: 1/5/2018  Discharge Date: 01/05/18      Discharge Diagnoses:  1.  Viral, flulike symptoms after influenza vaccine  2.  Coronary artery disease, status post stenting  3.  Hypertension  4.  Acute kidney injury  5.  Diabetes type 2    Pertinent Procedures:  1.  None performed    Consults:  None    Imaging with results:  Single view chest x-ray showed right infrahilar atelectasis versus consolidation.  However patient was not hypoxic tachycardic nor did she have a white count nor any other manifestation of infection    Brief HPI:  Katey Burrows is a 77 year old female with history of hypertension, DM II, HLD, DVT (in setting of prolonged hospitalization) and CAD s/p 3x ARMANDO to LAD 1/3/2018 who presents with fever, chills, nausea, vomiting.      History is obtained from the patient and her daughter.      The patient reports that after arriving home around 1:30 PM yesterday she was feeling well.  Within about 30 minutes though she began to feel fatigued and \"ill\".  She and her daughter report shaking chills and feeling feverish.  The patient had bilious emesis 2 or 3 times today that was not associated with eating food, or abdominal pain.  The patient reports that she has a very poor appetite and has not been able to eat anything today.     She denies any abdominal pain, diarrhea, constipation, sore throat, rhinorrhea, cough, sick contacts, chest pain.  Review of systems is only notable for mild muscle aches of her feet and a mild headache.  The site of catheterization in her left wrist is only mildly tender.  She took Tylenol for the fever at home.  The patient's daughter says she took her Brilinta today.  The patient recently had cellulitis of her groin a few months ago and " received antibiotics; the cellulitis since resolved and is not bothering her currently.     The patient's daughter called 911 and was transported to a nearby hospital.  There she was noted to have a fever to 101 Fahrenheit and blood pressure of 101/49.  She had a leukocytosis to 13.7 and troponin to 8.  Pro-count was 3.36.  They obtained influenza swab that was negative and an apparently normal looking chest x-ray.  They attempted to obtain blood cultures but were unsuccessful.  They did not give antibiotics, and subsequently transferred the patient directly to the [facility].     The patient was recently admitted to this hospital 1/2-1/4 for chest pain.  She was found to have coronary artery disease of the LAD and underwent placement of 3 drug-eluting stents 1/3/2018.  She was discharged yesterday on aspirin, Brilinta, atorvastatin, metoprolol, lisinopril, as well as sublingual nitroglycerin with plan to follow up with cardiology in 1-3 months when she returns home to [place].           Hospital Course by Diagnosis:    #.  Flu-like illness after vaccine:   patient had a typical reaction after receiving influenza vaccine including lethargy and mild elevation in her temperature.  She had no white count.  Chest x-ray was reassuring.  She was not febrile.  She is not tachycardic or hypoxic.  Most likely this was just a typical reaction after receiving the vaccine.  At time of discharge she had no symptoms, had stable vitals.    #.  Her coronary artery disease she will maintain on her Brilinta.  We will have her stop her aspirin given her recent diagnosis of DVT.    #.  DVT diagnosed at outside hospital: Patient will be started on Eliquis and will drop her aspirin    #. Acute kidney injury: Patient's baseline creatinine of approximately 0.8-1.0.  And on day of admission and discharge her creatinine was between 1.35 and 1.4.  Most likely etiology was contrast versus a prolonged n.p.o. status in the setting of  her recent procedures.  She was given IV fluids.  We will have her repeat a chemistry panel early next week locally and will follow-up the results once we receive it.    Condition on discharge  Temp:  [97.1  F (36.2  C)-98.1  F (36.7  C)] 97.1  F (36.2  C)  Heart Rate:  [] 71  Resp:  [16-18] 18  BP: ()/(43-69) 101/61  SpO2:  [98 %] 98 %  General: Alert, interactive, NAD  Eyes: sclera anicteric, EOMI  Neck: JVP without obvious elevation, carotid 2+ bilaterally  Cardiovascular: regular rate and rhythm, normal S1 and S2, no murmurs, gallops, or rubs  Resp: clear to auscultation bilaterally, no rales, wheezes, or rhonchi  GI: Soft, nontender, nondistended. +BS.  No HSM or masses, no rebound or guarding.  Extremities: Without edema, no cyanosis or clubbing, dorsalis pedis and posterior tibialis pulses 2+ bilaterally  Skin: Warm and dry, no jaundice or rash  Neuro: CN 2-12 intact, moves all extremities equally  Psych: Alert & oriented x 3      Medication Changes:  Stop aspirin  Start Eliquis      Discharge medications:   Current Discharge Medication List      START taking these medications    Details   apixaban ANTICOAGULANT (ELIQUIS) 5 MG tablet Take 1 tablet (5 mg) by mouth 2 times daily  Qty: 70 tablet, Refills: 0    Comments: Take two tablets twice daily for seven days and then take one tablet twice daily thereafter.  Associated Diagnoses: Acute venous embolism and thrombosis of deep vessels of distal lower extremity, unspecified laterality (H)         CONTINUE these medications which have NOT CHANGED    Details   nitroGLYcerin (NITROSTAT) 0.4 MG sublingual tablet For chest pain place 1 tablet under the tongue every 5 minutes for 3 doses. If symptoms persist 5 minutes after 1st dose call 911.  Qty: 25 tablet, Refills: 0    Comments: Take one tablet as needed under the tongue every 5 minutes as needed for chest discomfort.  Associated Diagnoses: ACS (acute coronary syndrome) (H)      ticagrelor (BRILINTA)  90 MG tablet Take 1 tablet (90 mg) by mouth every 12 hours  Qty: 180 tablet, Refills: 3    Comments: First dose evening of 1/4/18.  Associated Diagnoses: ACS (acute coronary syndrome) (H)      atorvastatin (LIPITOR) 40 MG tablet Take 1 tablet (40 mg) by mouth daily  Qty: 90 tablet, Refills: 3    Associated Diagnoses: ACS (acute coronary syndrome) (H)      metoprolol (LOPRESSOR) 25 MG tablet Take 0.5 tablets (12.5 mg) by mouth 2 times daily  Qty: 180 tablet, Refills: 3    Associated Diagnoses: ACS (acute coronary syndrome) (H)      Exenatide ER 2 MG/0.85ML AUIJ Inject 2 mg Subcutaneous once a week      olmesartan-hydrochlorothiazide (BENICAR) 20-12.5 MG per tablet Take 1 tablet by mouth      Multiple Vitamin (THERAGRAN OR) Take 1 tablet by mouth daily      metFORMIN (GLUCOPHAGE) 1000 MG tablet Take 1,000 mg by mouth 2 times daily (with meals)      !! NONFORMULARY 60 mg Gliclazide 60mg tabs      !! NONFORMULARY rcanidipina 20mg tabs       !! - Potential duplicate medications found. Please discuss with provider.      STOP taking these medications       aspirin (ASPIRIN 81) 81 MG chewable tablet Comments:   Reason for Stopping:               Labs or imaging requiring follow-up after discharge:  Patient received a BMP in 1 week and will schedule follow-up with cardiology when she returns steadily      Follow-up:  In [place] with a cardiologist sooner as needed here she has been given the clinic contact information    Code status:  Full    Pt was seen by, and discharge plan discussed with Dr. Denny Lanier MD  Cardiovascular Disease Fellow    Patient Care Team:  No Ref-Primary, Physician as PCP - General

## 2018-01-05 NOTE — IP AVS SNAPSHOT
MRN:[id number]                      After Visit Summary   1/5/2018    Katey Burrows    MRN: [id number]           Thank you!     Thank you for choosing [facility] for your care. Our goal is always to provide you with excellent care. Hearing back from our patients is one way we can continue to improve our services. Please take a few minutes to complete the written survey that you may receive in the mail after you visit with us. Thank you!        Patient Information     Date Of Birth          1940        Designated Caregiver       Most Recent Value    Caregiver    Will someone help with your care after discharge? yes    Name of designated caregiver surjit gomes    Phone number of caregiver [phone]    Caregiver address [address] [place] [address]      About your hospital stay     You were admitted on:  January 5, 2018 You last received care in the:  [address] [facility]    You were discharged on:  January 5, 2018        Reason for your hospital stay       Fever/reaction to influenza vaccine without any further evidence of infection including fevers, hypotension, tachycardia or obvious source of infection.                  Who to Call     For medical emergencies, please call 911.  For non-urgent questions about your medical care, please call your primary care provider or clinic, None          Attending Provider     Provider Specialty    Luciano Baldwin MD Cardiology       Primary Care Provider Fax #    Physician No Ref-Primary [phone]      After Care Instructions     Activity       Your activity upon discharge: activity as tolerated            Diet       Follow this diet upon discharge: Orders Placed This Encounter      Regular Diet Adult                  Follow-up Appointments     Follow Up and recommended labs and tests       BMP next week, order is electronically placed and we will follow up result once we receive it to monitor renal function.                  Future  tests that were ordered for you     Basic metabolic panel       Please obtain early next week 1/8-1/9                  Further instructions from your care team       Going Home after Coronary Angioplasty or Stent Placement       Name: Katey Burrows  Medical Record Number:  [id number]  Today's Date: January 5, 2018        For 24 hours:         Have an adult stay with you for 24 hours.         Relax and take it easy.         Drink plenty of fluids.         You may eat your normal diet, unless your doctor tells you otherwise.         Do NOT make any important or legal decisions.         Do NOT drive or operate machines at home or at work.         Do NOT drink alcohol.      Do NOT smoke.     Medicines:         If you have begun Plavix (clopidogrel), Effient (prasugrel), or Brilinta (ticagrelor), do not stop taking it until you talk to your heart doctor (cardiologist).         If you are on metformin (Glucophage), do not restart it until you have blood tests (within 2 to 3 days after discharge). When your doctor tells you it is safe, you may restart the metformin.         If you have stopped any other medicines, check with your nurse or provider about when to restart them.    Care of wrist or arm site:         It is normal to have soreness at the puncture site and mild tingling in your hand for up to 3 days.           Remove the Band-Aid after 24 hours. If there is minor oozing, apply another Band-aid and remove it after 12 hours.          Do NOT take a bath, or use a hot tub or pool for the next 48 hours. You may shower.          It is normal to have a small bruise.  There should not be a lump at the site.         Do not scrub the site.         Do not use lotion or powder near the puncture site for 3 days.         For 2 days, do not use your hand or arm to support your weight (such as rising from a chair) or bend your wrist (such as lifting a garage door).         For 2 days, do not lift more than 5 pounds or exercise  "your arm (tennis, golf or bowling).    If you start bleeding from the site in your arm: Sit down and press firmly on the site with your fingers for 10 minutes. Call your doctor as soon as you can.      Call 911 right away if you have bleeding that is heavy or does not stop.     Call your doctor if:         You have a large or growing hard lump around the site.         The site is red, swollen, hot or tender.         Blood or fluid is draining from the site.         You have chills or a fever greater than 101 F (38 C).         Your leg or arm turns bluish, feels numb or cool.         You have hives, a rash or unusual itching.         ADDITIONAL INSTRUCTIONS: None    [facility] [place]:   [phone] (7 days a week)      Cardiology Fellow on call (24 hours per day) at [facility]:   [phone] (ask for Cardiology Fellow on call)      Number where we can reach you:  ____________    Pending Results     Date and Time Order Name Status Description    1/5/2018 0233 EKG 12-lead, tracing only Preliminary     1/5/2018 0233 Blood culture Preliminary     1/5/2018 0233 Blood culture Preliminary             Statement of Approval     Ordered          01/05/18 6293  I have reviewed and agree with all the recommendations and orders detailed in this document.  EFFECTIVE NOW     Approved and electronically signed by:  Luciano Lanier MD             Admission Information     Date & Time Provider Department Dept. Phone    1/5/2018 Luciano Baldwin MD [address] [facility] [phone]      Your Vitals Were     Blood Pressure Temperature Respirations Height Weight Pulse Oximetry    107/60 (BP Location: Right arm) 98.6  F (37  C) (Oral) 18 1.499 m (4' 11\") 77.7 kg (171 lb 6.4 oz) 97%    BMI (Body Mass Index)                   34.62 kg/m2           MyChart Information     [facility] lets you send messages to your doctor, view your test results, renew your prescriptions, schedule appointments and more. " "To sign up, go to [email or web address] . Click on \"Log in\" on the left side of the screen, which will take you to the Welcome page. Then click on \"Sign up Now\" on the right side of the page.     You will be asked to enter the access code listed below, as well as some personal information. Please follow the directions to create your username and password.     Your access code is: [email or web address]  Expires: 2018 11:09 AM     Your access code will  in 90 days. If you need help or a new code, please call your [facility] clinic or [phone].        Care EveryWhere ID     This is your Care EveryWhere ID. This could be used by other organizations to access your [facility] medical records  [id number]        Equal Access to Services     DENIZ HERRERA : Valentin Waters, coreen bey, trae daniel, pearl gamble . So [facility] [phone].    ATENCIÓN: Si habla español, tiene a napoles disposición servicios gratuitos de asistencia lingüística. Rebecca al [phone].    We comply with applicable federal civil rights laws and [place] laws. We do not discriminate on the basis of race, color, national origin, age, disability, sex, sexual orientation, or gender identity.               Review of your medicines      START taking        Dose / Directions    apixaban ANTICOAGULANT 5 MG tablet   Commonly known as:  ELIQUIS   Used for:  Acute venous embolism and thrombosis of deep vessels of distal lower extremity, unspecified laterality (H)        Dose:  5 mg   Take 1 tablet (5 mg) by mouth 2 times daily   Quantity:  70 tablet   Refills:  0         CONTINUE these medicines which have NOT CHANGED        Dose / Directions    atorvastatin 40 MG tablet   Commonly known as:  LIPITOR   Used for:  ACS (acute coronary syndrome) (H)        Dose:  40 mg   Take 1 tablet (40 mg) by mouth daily   Quantity:  90 tablet   Refills:  3       Exenatide ER 2 MG/0.85ML Auij        Dose:  2 mg "   Inject 2 mg Subcutaneous once a week   Refills:  0       metFORMIN 1000 MG tablet   Commonly known as:  GLUCOPHAGE        Dose:  1000 mg   Take 1,000 mg by mouth 2 times daily (with meals)   Refills:  0       metoprolol 25 MG tablet   Commonly known as:  LOPRESSOR   Used for:  ACS (acute coronary syndrome) (H)        Dose:  12.5 mg   Take 0.5 tablets (12.5 mg) by mouth 2 times daily   Quantity:  180 tablet   Refills:  3       nitroGLYcerin 0.4 MG sublingual tablet   Commonly known as:  NITROSTAT   Used for:  ACS (acute coronary syndrome) (H)        For chest pain place 1 tablet under the tongue every 5 minutes for 3 doses. If symptoms persist 5 minutes after 1st dose call 911.   Quantity:  25 tablet   Refills:  0       * NONFORMULARY        Dose:  60 mg   60 mg Gliclazide 60mg tabs   Refills:  0       * NONFORMULARY        rcanidipina 20mg tabs   Refills:  0       olmesartan-hydrochlorothiazide 20-12.5 MG per tablet   Commonly known as:  BENICAR        Dose:  1 tablet   Take 1 tablet by mouth   Refills:  0       THERAGRAN OR        Dose:  1 tablet   Take 1 tablet by mouth daily   Refills:  0       ticagrelor 90 MG tablet   Commonly known as:  BRILINTA   Used for:  ACS (acute coronary syndrome) (H)        Dose:  90 mg   Take 1 tablet (90 mg) by mouth every 12 hours   Quantity:  180 tablet   Refills:  3       * Notice:  This list has 2 medication(s) that are the same as other medications prescribed for you. Read the directions carefully, and ask your doctor or other care provider to review them with you.      STOP taking     ASPIRIN 81 81 MG chewable tablet   Generic drug:  aspirin                Where to get your medicines      These medications were sent to [facility] [place], [place] - [address]  [address] [address]     Phone:  [phone]     apixaban ANTICOAGULANT 5 MG tablet                Protect others around you: Learn how to safely use, store and throw  away your medicines at www.disposemymeds.org.             Medication List: This is a list of all your medications and when to take them. Check marks below indicate your daily home schedule. Keep this list as a reference.      Medications           Morning Afternoon Evening Bedtime As Needed    apixaban ANTICOAGULANT 5 MG tablet   Commonly known as:  ELIQUIS   Take 1 tablet (5 mg) by mouth 2 times daily   Last time this was given:  10 mg on 1/5/2018 10:00 AM                                      atorvastatin 40 MG tablet   Commonly known as:  LIPITOR   Take 1 tablet (40 mg) by mouth daily   Last time this was given:  40 mg on 1/5/2018  8:33 AM                                   Exenatide ER 2 MG/0.85ML Auij   Inject 2 mg Subcutaneous once a week                                metFORMIN 1000 MG tablet   Commonly known as:  GLUCOPHAGE   Take 1,000 mg by mouth 2 times daily (with meals)                                      metoprolol 25 MG tablet   Commonly known as:  LOPRESSOR   Take 0.5 tablets (12.5 mg) by mouth 2 times daily   Last time this was given:  12.5 mg on 1/5/2018  8:33 AM                                      nitroGLYcerin 0.4 MG sublingual tablet   Commonly known as:  NITROSTAT   For chest pain place 1 tablet under the tongue every 5 minutes for 3 doses. If symptoms persist 5 minutes after 1st dose call 911.                                   * NONFORMULARY   60 mg Gliclazide 60mg tabs                                * NONFORMULARY   rcanidipina 20mg tabs                                olmesartan-hydrochlorothiazide 20-12.5 MG per tablet   Commonly known as:  BENICAR   Take 1 tablet by mouth                                THERAGRAN OR   Take 1 tablet by mouth daily                                ticagrelor 90 MG tablet   Commonly known as:  BRILINTA   Take 1 tablet (90 mg) by mouth every 12 hours   Last time this was given:  90 mg on 1/5/2018  8:33 AM                                * Notice:  This list has 2  medication(s) that are the same as other medications prescribed for you. Read the directions carefully, and ask your doctor or other care provider to review them with you.

## 2018-01-05 NOTE — PLAN OF CARE
Problem: Patient Care Overview  Goal: Plan of Care/Patient Progress Review  Vss.  md notified of procalcitonin of 11.3.  lr fluid flush of 500cc started as bp remains 108/43.  Map 69.  Afebrile.  mrsa swab sent.  Has not voided yet tonight.  Will monitor and notify md of changes or issues.

## 2018-01-05 NOTE — PLAN OF CARE
Problem: Patient Care Overview  Goal: Plan of Care/Patient Progress Review  Admitted to 6c from [facility] ED with c/o fever, and just not feeling well.  Had dcd from  1/4 after having plasty and stents.  Had received the flu shot before dc.  Daughter with her.  Brought in meds her daughter will keep.  Oriented to 6c routines, and call dont fall.  Monitor shows sr.  Vss.  Afebrile.  troponion 7.6.  md aware.  (? Was > 8 in [facility]).  Npo in fernanda of tests today.  bc sent. 500cc saline flush given.  S till needs mrsa swab and ua.  Daughter with her.  Will monitor and notify md of changes or issues.

## 2018-01-05 NOTE — PROGRESS NOTES
Patient has clinic visit within 24-48 hours of Discharge so no post DC follow up call is needed      Patient has returned to ED for further symptoms. Smiley Peña CMA Post Discharge Team

## 2018-01-05 NOTE — PROGRESS NOTES
JENNIFER consulted as the pt's daughter is stating the pt's insurance will not cover her for a BMP lab draw at discharge.  Pt has Medicare Part A only and has some  medical coverage plan. Daughter was not sure if it is ,  or another insurance.  Daughter only has pt's  ID card.  JENNIFER passed on request to [facility] to assist with clinic options for lab draw at discharge.    BARRY Torres, APSW  [address]   Phone: [phone]  Pager: [phone]  Unit: [phone]

## 2018-01-05 NOTE — PLAN OF CARE
"Problem: Patient Care Overview  Goal: Plan of Care/Patient Progress Review  Outcome: Improving  /61 (BP Location: Right arm)  Temp 97.1  F (36.2  C) (Oral)  Resp 18  Ht 1.499 m (4' 11\")  Wt 77.7 kg (171 lb 6.4 oz)  SpO2 98%  BMI 34.62 kg/m2    Pt readmitted following discharge only yesterday, following left arm angio and placement of three stents.  When settling at home, with daughter assisting, pt had acute chills followed by bile vomiting.   Returned by ambulance from outside hospital, being assessed for potential source of acute condition.  No fever, or emesis since readmission       "

## 2018-01-05 NOTE — H&P
"         Cardiology History and Physical  Katey Burrows MRN: [id number]  Age: 77 year old, : 1940  Primary care provider: No Ref-Primary, Physician            Chief Complaint:      Chills, nausea, vomiting           History of Present Illness:      Katey Burrows is a 77 year old female with history of hypertension, DM II, HLD, DVT (in setting of prolonged hospitalization) and CAD s/p 3x ARMANDO to LAD 1/3/2018 who presents with fever, chills, nausea, vomiting.     History is obtained from the patient and her daughter.     The patient reports that after arriving home around 1:30 PM yesterday she was feeling well.  Within about 30 minutes though she began to feel fatigued and \"ill\".  She and her daughter report shaking chills and feeling feverish.  The patient had bilious emesis 2 or 3 times today that was not associated with eating food, or abdominal pain.  The patient reports that she has a very poor appetite and has not been able to eat anything today.    She denies any abdominal pain, diarrhea, constipation, sore throat, rhinorrhea, cough, sick contacts, chest pain.  Review of systems is only notable for mild muscle aches of her feet and a mild headache.  The site of catheterization in her left wrist is only mildly tender.  She took Tylenol for the fever at home.  The patient's daughter says she took her Brilinta today.  The patient recently had cellulitis of her groin a few months ago and received antibiotics; the cellulitis since resolved and is not bothering her currently.    The patient's daughter called 911 and was transported to a nearby hospital.  There she was noted to have a fever to 101 Fahrenheit and blood pressure of 101/49.  She had a leukocytosis to 13.7 and troponin to 8.  Pro-count was 3.36.  They obtained influenza swab that was negative and an apparently normal looking chest x-ray.  They attempted to obtain blood cultures but were unsuccessful.  They did not give antibiotics, and subsequently " transferred the patient directly to the [facility].    The patient was recently admitted to this hospital - for chest pain.  She was found to have coronary artery disease of the LAD and underwent placement of 3 drug-eluting stents 1/3/2018.  She was discharged yesterday on aspirin, Brilinta, atorvastatin, metoprolol, lisinopril, as well as sublingual nitroglycerin with plan to follow up with cardiology in 1-3 months when she returns home to [place].           Past Medical History:      DM II  HTN  HLD  CAD s/p ARMANDO x3 to LAD 2018           Past Surgical History:       Denies           Social History:      Patient is from [place], she is currently in the [place] as her daughter unexpectedly  of myocardial infarction 2 months ago.             Family History:      Both mother and daughter  of a myocardial infarction.           Allergies:           Allergies   Allergen Reactions     Macrodantin [Nitrofurantoin] Unknown               Medications:          Current Outpatient Prescriptions   Medication     nitroGLYcerin (NITROSTAT) 0.4 MG sublingual tablet     ticagrelor (BRILINTA) 90 MG tablet     atorvastatin (LIPITOR) 40 MG tablet     metoprolol (LOPRESSOR) 25 MG tablet     aspirin (ASPIRIN 81) 81 MG chewable tablet     Exenatide ER 2 MG/0.85ML AUIJ     olmesartan-hydrochlorothiazide (BENICAR) 20-12.5 MG per tablet     Multiple Vitamin (THERAGRAN OR)     metFORMIN (GLUCOPHAGE) 1000 MG tablet     NONFORMULARY     NONFORMULARY              Physical Exam:      Temp:  [97.5  F (36.4  C)-98.1  F (36.7  C)] 98.1  F (36.7  C)  Heart Rate:  [] 765  Resp:  [16-18] 16  BP: (107-154)/(61-76) 107/61  SpO2:  [97 %-100 %] 98 %         Wt Readings from Last 4 Encounters:   18 79 kg (174 lb 2.6 oz)      Gen: AA&Ox3, tired appearing  HEENT:AT/ NC, PERRL b/l, EOM grossly intact, mucous membranes pink, moist without plaque or exudate, no cervical lymphadenopathy  BACK: no CVA tenderness, no  midline bony tenderness  PULM/THORAX: Clear to auscultation bilaterally, no rales/rhonchi/wheezes  CV: RRR, S1 and S2 appreciated, no extra heart sounds, murmurs or rub auscultated. No JVD  ABD: obese, soft, nontender, nondistended. Normoactive bowel sounds x 4, no HSM appreciated.  Rectal: Deferred  EXT: Bruising of left arm and 1 cm nodule at site of prior catheterization of left wrist; nontender, is not warm.  No edema, clubbing or cyanosis. No asymmetrical edema or tenderness to palpation in calves bilaterally.  NEURO: CN II-XII intact, strength 5/5 throughout     Lines: none  Drips: none           Data:      Labs at outside hospital were notable for the following:    Leukocytosis to 13.7  Pro calcitonin of 3.36  Influenza swab negative  Chest x-ray negative    Normal bicarb of 22  Normal lactic acid 1.4  Troponin of 8 (increased from 1.73 here on 1 January)       Most Recent Imaging:      Stress Test: none available     Echo 1/2/2018  Interpretation Summary  Global and regional left ventricular function is normal with an EF of 55-60%. No regional wall motion abnormalities are seen. Global right ventricular function is normal. Mild to moderate left atrial enlargement is present. Moderate mitral annular calcification is present. Mild to moderate aortic valve sclerosis is present. The inferior vena cava was normal in size with preserved respiratory variability. No pericardial effusion is present. Previous study not available for comparison.     Cath 1/3/2018  -Single vessel CAD:  LAD   -80% stenosis of the proximal to mid LAD  -70% in the distal LAD  -40% stenosis of the proximal portion of OM1,   -50% stenosis of the proximal portion of OM3(small caliber vessel)  -60% stenosis of the proximal non-dominant RCA  -Successful deployment of a drug eluting stents  -3.5 x24 mm Synergy  drug eluting stent was successfully deployed across the proximal LAD with post dilation with 4.0 NC balloon  -3.0 x38 mm  Synergy  drug  eluting stent across the mid LAD   -2.25 x 24 mm Synergy stent across the distal LAD                  Assessment and Plan:      Katey Burrows is a 77 year old female with history of hypertension, DM II, HLD, DVT (in setting of prolonged hospitalization) and CAD s/p 3x ARMANDO to LAD 1/3/2018 who presents with bilious emesis, fatigue, and chills, found to have fever, leukocytosis to 13.7, pro calcitonin to 3.36, and troponin of 8 at outside hospital, [facility].      # Fever  # SIRS criteria (leukocytosis, fever)  Patient with less than 1 day of fatigue, vomiting.  Found to have leukocytosis to 13.7 and pro calcitonin of 3.36 at outside hospital.  Influenza swab negative.  Relatively hypotensive compared to prior hospitalization. No clear source, no cough, abd pain, dysuria, viral findings (rhinorrhea, sore throat, myalgias, etc...).  -Blood cultures  -Urinalysis  -Chest x-ray  -CBC with differential, BMP, mag, phosphorus, MRSA swab, lactic acid  -Pro calcitonin  -Hold PTA Benicar 20-12.5 mg daily, hold rcanidipina 20 mg daily  -500 cc normal saline bolus  -trend I/O  -we will consider empiric antibiotics pending above labs     # Elevated troponin  # Hx CAD, ARMANDO x3 to LAD 1/3/2018  Troponin obtained at outside hospital earlier today despite lack of any chest pain, though diabetic women may present with atypical symptom of ACS.  Recently had catheterization with 3 drug-eluting stent to the LAD January 3. Last received ticagrelor at OSH earlier this evening.  -Continue PTA nitroglycerin tablet as needed for chest pain  -Continue PTA metoprolol 12.5 mg twice daily  -Continue PTA ticagrelor 90 mg every 12 hours  -Continue PTA atorvastatin 40 mg daily  -Continue PTA aspirin 81 mg daily  -Troponin and EKG now    Chronic medical conditions:  # Diabetes mellitus type 2: Hold PTA metformin 1000 mg twice daily, gliclazide 60 mg, Exenatide 2 mg subQ once weekly; start sliding  scale insulin  # Hypertension: Hold PTA Benicar 20-12.5 mg daily, hold PTA rcanidipina 20 mg daily  # Normocytic anemia, stable: Trend    FEN: 500 cc normal saline bolus, replete electrolytes as needed, clear liquid diet  PPX: Enoxaparin  Code Status: full code      To staff in the [facility]  PGY-2 Internal Medicine  Cardiology Service  Pager: [phone]

## 2018-01-11 LAB
BACTERIA SPEC CULT: NO GROWTH
BACTERIA SPEC CULT: NO GROWTH
SPECIMEN SOURCE: NORMAL
SPECIMEN SOURCE: NORMAL

## 2018-01-19 NOTE — UTILIZATION REVIEW
"Admission Status; Secondary Review Determination  Foe admission between 1/5/2018-1/5/2018    Under the authority of the Utilization Management Committee, the utilization review process indicated a secondary review on the above patient. The review outcome is based on review of the medical records, discussions with staff, and applying clinical experience noted on the date of the review.       () Inpatient Status Appropriate - This patient's medical care is consistent with medical management for inpatient care and reasonable inpatient medical practice.  (X) Observation Status Appropriate - This patient does not meet hospital inpatient criteria and is placed in observation status. If this patient's primary payer is Medicare and was admitted as an inpatient, Condition Code 44 should be used and patient status changed to \"observation\".  () Admission Status Not Appropriate - This patient's medical care is not consistent with medical management for Inpatient or Observation Status.        RATIONALE FOR DETERMINATION   Patient was in hospital for less than 24 hrs. Was observed  Overnight  Diagnosis was viral flu like illness post influenza vaccine   Treatment was largely supportive  She needed observation care however, in the setting of chest pain on a background of recent coronary catheterization     "

## (undated) RX ORDER — NITROGLYCERIN 5 MG/ML
VIAL (ML) INTRAVENOUS
Status: DISPENSED
Start: 2018-01-03

## (undated) RX ORDER — HEPARIN SODIUM 1000 [USP'U]/ML
INJECTION, SOLUTION INTRAVENOUS; SUBCUTANEOUS
Status: DISPENSED
Start: 2018-01-03

## (undated) RX ORDER — FENTANYL CITRATE 50 UG/ML
INJECTION, SOLUTION INTRAMUSCULAR; INTRAVENOUS
Status: DISPENSED
Start: 2018-01-03

## (undated) RX ORDER — NICARDIPINE HYDROCHLORIDE 2.5 MG/ML
INJECTION INTRAVENOUS
Status: DISPENSED
Start: 2018-01-03